# Patient Record
Sex: MALE | Race: WHITE | NOT HISPANIC OR LATINO | URBAN - METROPOLITAN AREA
[De-identification: names, ages, dates, MRNs, and addresses within clinical notes are randomized per-mention and may not be internally consistent; named-entity substitution may affect disease eponyms.]

---

## 2019-03-14 ENCOUNTER — RECORDS - HEALTHEAST (OUTPATIENT)
Dept: LAB | Facility: CLINIC | Age: 84
End: 2019-03-14

## 2019-03-15 ENCOUNTER — COMMUNICATION - HEALTHEAST (OUTPATIENT)
Dept: GERIATRICS | Facility: CLINIC | Age: 84
End: 2019-03-15

## 2019-03-15 ENCOUNTER — OFFICE VISIT - HEALTHEAST (OUTPATIENT)
Dept: GERIATRICS | Facility: CLINIC | Age: 84
End: 2019-03-15

## 2019-03-15 DIAGNOSIS — R53.81 PHYSICAL DECONDITIONING: ICD-10-CM

## 2019-03-15 DIAGNOSIS — R19.7 DIARRHEA, UNSPECIFIED TYPE: ICD-10-CM

## 2019-03-15 LAB — INR PPP: 1.4 (ref 0.9–1.1)

## 2019-03-16 ENCOUNTER — RECORDS - HEALTHEAST (OUTPATIENT)
Dept: LAB | Facility: CLINIC | Age: 84
End: 2019-03-16

## 2019-03-16 LAB
ANION GAP SERPL CALCULATED.3IONS-SCNC: 10 MMOL/L (ref 5–18)
BUN SERPL-MCNC: 13 MG/DL (ref 8–28)
C DIFF TOX B STL QL: NEGATIVE
CALCIUM SERPL-MCNC: 8.8 MG/DL (ref 8.5–10.5)
CHLORIDE BLD-SCNC: 105 MMOL/L (ref 98–107)
CO2 SERPL-SCNC: 26 MMOL/L (ref 22–31)
CREAT SERPL-MCNC: 1.01 MG/DL (ref 0.7–1.3)
ERYTHROCYTE [DISTWIDTH] IN BLOOD BY AUTOMATED COUNT: 14 % (ref 11–14.5)
GFR SERPL CREATININE-BSD FRML MDRD: >60 ML/MIN/1.73M2
GLUCOSE BLD-MCNC: 160 MG/DL (ref 70–125)
HCT VFR BLD AUTO: 33.8 % (ref 40–54)
HGB BLD-MCNC: 10.8 G/DL (ref 14–18)
MAGNESIUM SERPL-MCNC: 1.6 MG/DL (ref 1.8–2.6)
MCH RBC QN AUTO: 31.2 PG (ref 27–34)
MCHC RBC AUTO-ENTMCNC: 32 G/DL (ref 32–36)
MCV RBC AUTO: 98 FL (ref 80–100)
PLATELET # BLD AUTO: 261 THOU/UL (ref 140–440)
PMV BLD AUTO: 10.1 FL (ref 8.5–12.5)
POTASSIUM BLD-SCNC: 5.1 MMOL/L (ref 3.5–5)
RBC # BLD AUTO: 3.46 MILL/UL (ref 4.4–6.2)
RIBOTYPE 027/NAP1/BI: NORMAL
SODIUM SERPL-SCNC: 141 MMOL/L (ref 136–145)
WBC: 6.7 THOU/UL (ref 4–11)

## 2019-03-17 ENCOUNTER — AMBULATORY - HEALTHEAST (OUTPATIENT)
Dept: ADMINISTRATIVE | Facility: CLINIC | Age: 84
End: 2019-03-17

## 2019-03-17 RX ORDER — ATORVASTATIN CALCIUM 40 MG/1
40 TABLET, FILM COATED ORAL AT BEDTIME
Status: SHIPPED | COMMUNITY
Start: 2019-03-17

## 2019-03-17 RX ORDER — DULOXETIN HYDROCHLORIDE 60 MG/1
60 CAPSULE, DELAYED RELEASE ORAL DAILY
Status: SHIPPED | COMMUNITY
Start: 2019-03-17

## 2019-03-17 RX ORDER — METOPROLOL SUCCINATE 25 MG/1
25 TABLET, EXTENDED RELEASE ORAL DAILY
Status: SHIPPED | COMMUNITY
Start: 2019-03-17

## 2019-03-18 LAB — INR PPP: 2.16 (ref 0.9–1.1)

## 2019-03-19 ENCOUNTER — OFFICE VISIT - HEALTHEAST (OUTPATIENT)
Dept: GERIATRICS | Facility: CLINIC | Age: 84
End: 2019-03-19

## 2019-03-19 DIAGNOSIS — C18.9: ICD-10-CM

## 2019-03-19 DIAGNOSIS — I48.19 PERSISTENT ATRIAL FIBRILLATION (H): ICD-10-CM

## 2019-03-19 DIAGNOSIS — D64.9 ANEMIA, UNSPECIFIED TYPE: ICD-10-CM

## 2019-03-19 DIAGNOSIS — R41.89 COGNITIVE IMPAIRMENT: ICD-10-CM

## 2019-03-20 ENCOUNTER — RECORDS - HEALTHEAST (OUTPATIENT)
Dept: LAB | Facility: CLINIC | Age: 84
End: 2019-03-20

## 2019-03-21 ENCOUNTER — OFFICE VISIT - HEALTHEAST (OUTPATIENT)
Dept: GERIATRICS | Facility: CLINIC | Age: 84
End: 2019-03-21

## 2019-03-21 DIAGNOSIS — R41.89 COGNITIVE IMPAIRMENT: ICD-10-CM

## 2019-03-21 DIAGNOSIS — D64.9 ANEMIA, UNSPECIFIED TYPE: ICD-10-CM

## 2019-03-21 DIAGNOSIS — I48.19 PERSISTENT ATRIAL FIBRILLATION (H): ICD-10-CM

## 2019-03-21 DIAGNOSIS — N28.9 KIDNEY DISEASE: ICD-10-CM

## 2019-03-21 DIAGNOSIS — C18.9: ICD-10-CM

## 2019-03-21 LAB
ANION GAP SERPL CALCULATED.3IONS-SCNC: 9 MMOL/L (ref 5–18)
BUN SERPL-MCNC: 20 MG/DL (ref 8–28)
CALCIUM SERPL-MCNC: 8.9 MG/DL (ref 8.5–10.5)
CHLORIDE BLD-SCNC: 103 MMOL/L (ref 98–107)
CO2 SERPL-SCNC: 26 MMOL/L (ref 22–31)
CREAT SERPL-MCNC: 1.32 MG/DL (ref 0.7–1.3)
GFR SERPL CREATININE-BSD FRML MDRD: 51 ML/MIN/1.73M2
GLUCOSE BLD-MCNC: 270 MG/DL (ref 70–125)
POTASSIUM BLD-SCNC: 5.1 MMOL/L (ref 3.5–5)
SODIUM SERPL-SCNC: 138 MMOL/L (ref 136–145)

## 2019-03-25 ENCOUNTER — COMMUNICATION - HEALTHEAST (OUTPATIENT)
Dept: GERIATRICS | Facility: CLINIC | Age: 84
End: 2019-03-25

## 2019-03-25 ENCOUNTER — RECORDS - HEALTHEAST (OUTPATIENT)
Dept: LAB | Facility: CLINIC | Age: 84
End: 2019-03-25

## 2019-03-25 ENCOUNTER — OFFICE VISIT - HEALTHEAST (OUTPATIENT)
Dept: GERIATRICS | Facility: CLINIC | Age: 84
End: 2019-03-25

## 2019-03-25 DIAGNOSIS — E87.5 HYPERKALEMIA: ICD-10-CM

## 2019-03-25 DIAGNOSIS — I48.21 PERMANENT ATRIAL FIBRILLATION (H): ICD-10-CM

## 2019-03-25 LAB — INR PPP: 3.61 (ref 0.9–1.1)

## 2019-03-26 ENCOUNTER — RECORDS - HEALTHEAST (OUTPATIENT)
Dept: LAB | Facility: CLINIC | Age: 84
End: 2019-03-26

## 2019-03-26 ENCOUNTER — COMMUNICATION - HEALTHEAST (OUTPATIENT)
Dept: GERIATRICS | Facility: CLINIC | Age: 84
End: 2019-03-26

## 2019-03-26 LAB
INR PPP: 3.35 (ref 0.9–1.1)
POTASSIUM BLD-SCNC: 4.4 MMOL/L (ref 3.5–5)

## 2019-03-27 ENCOUNTER — OFFICE VISIT - HEALTHEAST (OUTPATIENT)
Dept: GERIATRICS | Facility: CLINIC | Age: 84
End: 2019-03-27

## 2019-03-27 DIAGNOSIS — K56.609 COLON OBSTRUCTION (H): ICD-10-CM

## 2019-03-27 DIAGNOSIS — I48.21 PERMANENT ATRIAL FIBRILLATION (H): ICD-10-CM

## 2019-03-27 RX ORDER — MAGNESIUM OXIDE 400 MG/1
400 TABLET ORAL 2 TIMES DAILY
Status: SHIPPED | COMMUNITY
Start: 2019-03-27

## 2019-03-27 RX ORDER — FERROUS SULFATE 325(65) MG
1 TABLET ORAL
Status: SHIPPED | COMMUNITY
Start: 2019-03-27

## 2019-03-29 ENCOUNTER — AMBULATORY - HEALTHEAST (OUTPATIENT)
Dept: GERIATRICS | Facility: CLINIC | Age: 84
End: 2019-03-29

## 2020-04-28 ENCOUNTER — AMBULATORY - HEALTHEAST (OUTPATIENT)
Dept: LAB | Facility: HOSPITAL | Age: 85
End: 2020-04-28

## 2020-04-28 DIAGNOSIS — C18.2 MALIGNANT NEOPLASM OF ASCENDING COLON (H): ICD-10-CM

## 2020-04-28 LAB — CEA SERPL-MCNC: 5.2 NG/ML (ref 0–3)

## 2020-09-08 ENCOUNTER — RECORDS - HEALTHEAST (OUTPATIENT)
Dept: LAB | Facility: CLINIC | Age: 85
End: 2020-09-08

## 2020-09-09 LAB
ALBUMIN SERPL-MCNC: 3.6 G/DL (ref 3.5–5)
ALP SERPL-CCNC: 120 U/L (ref 45–120)
ALT SERPL W P-5'-P-CCNC: 17 U/L (ref 0–45)
ANION GAP SERPL CALCULATED.3IONS-SCNC: 10 MMOL/L (ref 5–18)
AST SERPL W P-5'-P-CCNC: 23 U/L (ref 0–40)
BASOPHILS # BLD AUTO: 0 THOU/UL (ref 0–0.2)
BASOPHILS NFR BLD AUTO: 1 % (ref 0–2)
BILIRUB SERPL-MCNC: 0.8 MG/DL (ref 0–1)
BUN SERPL-MCNC: 21 MG/DL (ref 8–28)
CALCIUM SERPL-MCNC: 9 MG/DL (ref 8.5–10.5)
CHLORIDE BLD-SCNC: 99 MMOL/L (ref 98–107)
CHOLEST SERPL-MCNC: 128 MG/DL
CO2 SERPL-SCNC: 29 MMOL/L (ref 22–31)
CREAT SERPL-MCNC: 1.39 MG/DL (ref 0.7–1.3)
EOSINOPHIL # BLD AUTO: 0.2 THOU/UL (ref 0–0.4)
EOSINOPHIL NFR BLD AUTO: 4 % (ref 0–6)
ERYTHROCYTE [DISTWIDTH] IN BLOOD BY AUTOMATED COUNT: 12.6 % (ref 11–14.5)
FASTING STATUS PATIENT QL REPORTED: ABNORMAL
GFR SERPL CREATININE-BSD FRML MDRD: 48 ML/MIN/1.73M2
GLUCOSE BLD-MCNC: 267 MG/DL (ref 70–125)
HCT VFR BLD AUTO: 39.4 % (ref 40–54)
HDLC SERPL-MCNC: 29 MG/DL
HGB BLD-MCNC: 12.6 G/DL (ref 14–18)
IMM GRANULOCYTES # BLD: 0 THOU/UL
IMM GRANULOCYTES NFR BLD: 0 %
LDLC SERPL CALC-MCNC: 47 MG/DL
LYMPHOCYTES # BLD AUTO: 1.1 THOU/UL (ref 0.8–4.4)
LYMPHOCYTES NFR BLD AUTO: 18 % (ref 20–40)
MCH RBC QN AUTO: 32.3 PG (ref 27–34)
MCHC RBC AUTO-ENTMCNC: 32 G/DL (ref 32–36)
MCV RBC AUTO: 101 FL (ref 80–100)
MONOCYTES # BLD AUTO: 0.5 THOU/UL (ref 0–0.9)
MONOCYTES NFR BLD AUTO: 9 % (ref 2–10)
NEUTROPHILS # BLD AUTO: 4 THOU/UL (ref 2–7.7)
NEUTROPHILS NFR BLD AUTO: 68 % (ref 50–70)
PLATELET # BLD AUTO: 189 THOU/UL (ref 140–440)
PMV BLD AUTO: 10.8 FL (ref 8.5–12.5)
POTASSIUM BLD-SCNC: 4.2 MMOL/L (ref 3.5–5)
PROT SERPL-MCNC: 7.2 G/DL (ref 6–8)
RBC # BLD AUTO: 3.9 MILL/UL (ref 4.4–6.2)
SODIUM SERPL-SCNC: 138 MMOL/L (ref 136–145)
TRIGL SERPL-MCNC: 258 MG/DL
WBC: 5.9 THOU/UL (ref 4–11)

## 2020-10-06 ENCOUNTER — RECORDS - HEALTHEAST (OUTPATIENT)
Dept: LAB | Facility: CLINIC | Age: 85
End: 2020-10-06

## 2020-10-07 LAB
FERRITIN SERPL-MCNC: 154 NG/ML (ref 27–300)
HBA1C MFR BLD: 7.7 %
IRON SERPL-MCNC: 78 UG/DL (ref 42–175)
MAGNESIUM SERPL-MCNC: 1.5 MG/DL (ref 1.8–2.6)
VIT B12 SERPL-MCNC: 777 PG/ML (ref 213–816)

## 2020-10-08 LAB — 25(OH)D3 SERPL-MCNC: 24.2 NG/ML (ref 30–80)

## 2020-10-21 ENCOUNTER — RECORDS - HEALTHEAST (OUTPATIENT)
Dept: LAB | Facility: CLINIC | Age: 85
End: 2020-10-21

## 2020-10-21 LAB
AFP SERPL-MCNC: 2.1 UG/ML
ALBUMIN SERPL-MCNC: 4.2 G/DL (ref 3.5–5)
ALP SERPL-CCNC: 97 U/L (ref 45–120)
ALT SERPL W P-5'-P-CCNC: 16 U/L (ref 0–45)
ANION GAP SERPL CALCULATED.3IONS-SCNC: 13 MMOL/L (ref 5–18)
AST SERPL W P-5'-P-CCNC: 22 U/L (ref 0–40)
BILIRUB SERPL-MCNC: 1.1 MG/DL (ref 0–1)
BUN SERPL-MCNC: 25 MG/DL (ref 8–28)
CALCIUM SERPL-MCNC: 9.3 MG/DL (ref 8.5–10.5)
CHLORIDE BLD-SCNC: 103 MMOL/L (ref 98–107)
CO2 SERPL-SCNC: 25 MMOL/L (ref 22–31)
CREAT SERPL-MCNC: 1.32 MG/DL (ref 0.7–1.3)
ERYTHROCYTE [DISTWIDTH] IN BLOOD BY AUTOMATED COUNT: 12.8 % (ref 11–14.5)
GFR SERPL CREATININE-BSD FRML MDRD: 51 ML/MIN/1.73M2
GLUCOSE BLD-MCNC: 123 MG/DL (ref 70–125)
HCT VFR BLD AUTO: 40.3 % (ref 40–54)
HGB BLD-MCNC: 13.3 G/DL (ref 14–18)
MCH RBC QN AUTO: 33 PG (ref 27–34)
MCHC RBC AUTO-ENTMCNC: 33 G/DL (ref 32–36)
MCV RBC AUTO: 100 FL (ref 80–100)
PLATELET # BLD AUTO: 206 THOU/UL (ref 140–440)
PMV BLD AUTO: 10.6 FL (ref 8.5–12.5)
POTASSIUM BLD-SCNC: 5.3 MMOL/L (ref 3.5–5)
PROT SERPL-MCNC: 7.3 G/DL (ref 6–8)
RBC # BLD AUTO: 4.03 MILL/UL (ref 4.4–6.2)
SODIUM SERPL-SCNC: 141 MMOL/L (ref 136–145)
WBC: 8.3 THOU/UL (ref 4–11)

## 2020-10-27 ENCOUNTER — RECORDS - HEALTHEAST (OUTPATIENT)
Dept: LAB | Facility: CLINIC | Age: 85
End: 2020-10-27

## 2020-10-28 LAB
ANION GAP SERPL CALCULATED.3IONS-SCNC: 9 MMOL/L (ref 5–18)
BUN SERPL-MCNC: 27 MG/DL (ref 8–28)
CALCIUM SERPL-MCNC: 9.1 MG/DL (ref 8.5–10.5)
CHLORIDE BLD-SCNC: 100 MMOL/L (ref 98–107)
CO2 SERPL-SCNC: 28 MMOL/L (ref 22–31)
CREAT SERPL-MCNC: 1.31 MG/DL (ref 0.7–1.3)
GFR SERPL CREATININE-BSD FRML MDRD: 51 ML/MIN/1.73M2
GLUCOSE BLD-MCNC: 220 MG/DL (ref 70–125)
POTASSIUM BLD-SCNC: 3.9 MMOL/L (ref 3.5–5)
SODIUM SERPL-SCNC: 137 MMOL/L (ref 136–145)

## 2020-11-26 ENCOUNTER — NURSE TRIAGE (OUTPATIENT)
Dept: NURSING | Facility: CLINIC | Age: 85
End: 2020-11-26

## 2020-11-27 NOTE — TELEPHONE ENCOUNTER
Natalio (son) calls and says that his father now lives at an assisted living facility and has covid. Natalio says that his father has a cough, headache, and a sore throat. Natalio says that he does not know which hospital to take his father to if his father gets worse. RN told Natalio that his father needs to go to an ER that his insurance pays for. Natalio voiced understanding. Natalio says that he will monitor his father's condition with the nurse at the assisted living facility. COVID 19 Nurse Triage Plan/Patient Instructions    Please be aware that novel coronavirus (COVID-19) may be circulating in the community. If you develop symptoms such as fever, cough, or SOB or if you have concerns about the presence of another infection including coronavirus (COVID-19), please contact your health care provider or visit [email or web address].     Disposition/Instructions    Home care recommended. Follow home care protocol based instructions.    Thank you for taking steps to prevent the spread of this virus.  o Limit your contact with others.  o Wear a simple mask to cover your cough.  o Wash your hands well and often.    Resources    [facility]: About COVID-19: [email or web address]    CDC: What to Do If You're Sick: www.cdc.gov/coronavirus/2019-ncov/about/steps-when-sick.html    CDC: Ending Home Isolation: www.cdc.gov/coronavirus/2019-ncov/hcp/disposition-in-home-patients.html     CDC: Caring for Someone: www.cdc.gov/coronavirus/2019-ncov/if-you-are-sick/care-for-someone.html     [facility]: Interim Guidance for Hospital Discharge to Home: www.health.[facility].mn.us/diseases/coronavirus/hcp/hospdischarge.pdf    [facility] clinical trials (COVID-19 research studies): clinicalaffairs.[facility].edu/um-clinical-trials     Below are the COVID-19 hotlines at the [facility] ([facility]). Interpreters are available.   o For health questions: Call [phone] or 1-644.876.9442 (7 a.m. to 7 p.m.)  o For questions about schools and  childcare: Call [phone] or [phone] (7 a.m. to 7 p.m.)                     Additional Information    Negative: [1] Caller is not with the adult (patient) AND [2] reporting urgent symptoms    Negative: Lab result questions    Negative: Medication questions    Negative: Caller can't be reached by phone    Negative: Caller has already spoken to PCP or another triager    Negative: RN needs further essential information from caller in order to complete triage    Negative: Requesting regular office appointment    Negative: [1] Caller requesting NON-URGENT health information AND [2] PCP's office is the best resource    Health Information question, no triage required and triager able to answer question    Protocols used: INFORMATION ONLY CALL-A-

## 2020-11-27 NOTE — TELEPHONE ENCOUNTER
Father is currently in assisted living. He had COVID--tested positive last Friday. He is not having difficulty breathing. Since yesterday, standing order if he gets worse to send him to the hospital.   SX: sore throat, nonproductive dry cough, body aches (began yesterday). Oximeter= 95%. No shortness of breath. Headache. They are giving him Tylenol and Ibuprofen for the pain, and it is helping.   Son is wondering about antibody treatment ? They are using it at [place], but they are unable to bring him there. Son wants to send him to a hospital that has it. He states his father's Dr ordered the drug, but they have been unable to locate it.   [facility] ER phone number. Son will call them now.     Kayley Fish RN Triage Nurse Advisor 10:03 PM 11/26/2020    Additional Information    General information question, no triage required and triager able to answer question    Protocols used: INFORMATION ONLY CALL-A-

## 2020-12-28 ENCOUNTER — RECORDS - HEALTHEAST (OUTPATIENT)
Dept: LAB | Facility: CLINIC | Age: 85
End: 2020-12-28

## 2020-12-28 LAB
ALBUMIN UR-MCNC: ABNORMAL MG/DL
APPEARANCE UR: CLEAR
BACTERIA #/AREA URNS HPF: ABNORMAL HPF
BILIRUB UR QL STRIP: NEGATIVE
COLOR UR AUTO: YELLOW
GLUCOSE UR STRIP-MCNC: NEGATIVE MG/DL
HGB UR QL STRIP: NEGATIVE
KETONES UR STRIP-MCNC: NEGATIVE MG/DL
LEUKOCYTE ESTERASE UR QL STRIP: NEGATIVE
NITRATE UR QL: NEGATIVE
PH UR STRIP: 5.5 [PH] (ref 4.5–8)
RBC #/AREA URNS AUTO: ABNORMAL HPF
SP GR UR STRIP: 1.02 (ref 1–1.03)
SQUAMOUS #/AREA URNS AUTO: ABNORMAL LPF
UROBILINOGEN UR STRIP-ACNC: ABNORMAL
WBC #/AREA URNS AUTO: ABNORMAL HPF

## 2021-01-12 ENCOUNTER — RECORDS - HEALTHEAST (OUTPATIENT)
Dept: LAB | Facility: CLINIC | Age: 86
End: 2021-01-12

## 2021-01-12 LAB
SARS-COV-2 PCR COMMENT: NORMAL
SARS-COV-2 RNA SPEC QL NAA+PROBE: NEGATIVE
SARS-COV-2 VIRUS SPECIMEN SOURCE: NORMAL

## 2021-02-04 ENCOUNTER — AMBULATORY - HEALTHEAST (OUTPATIENT)
Dept: GERIATRICS | Facility: CLINIC | Age: 86
End: 2021-02-04

## 2021-02-04 DIAGNOSIS — K21.9 GASTROESOPHAGEAL REFLUX DISEASE WITHOUT ESOPHAGITIS: ICD-10-CM

## 2021-02-05 ENCOUNTER — RECORDS - HEALTHEAST (OUTPATIENT)
Dept: LAB | Facility: CLINIC | Age: 86
End: 2021-02-05

## 2021-02-05 ENCOUNTER — OFFICE VISIT - HEALTHEAST (OUTPATIENT)
Dept: GERIATRICS | Facility: CLINIC | Age: 86
End: 2021-02-05

## 2021-02-05 DIAGNOSIS — I48.20 CHRONIC ATRIAL FIBRILLATION (H): ICD-10-CM

## 2021-02-05 DIAGNOSIS — K65.1 ABDOMINAL VISCERAL ABSCESS (H): ICD-10-CM

## 2021-02-05 DIAGNOSIS — I10 ESSENTIAL HYPERTENSION: ICD-10-CM

## 2021-02-05 DIAGNOSIS — Z71.89 ACP (ADVANCE CARE PLANNING): ICD-10-CM

## 2021-02-05 DIAGNOSIS — E11.9 TYPE 2 DIABETES MELLITUS WITHOUT COMPLICATION, WITHOUT LONG-TERM CURRENT USE OF INSULIN (H): ICD-10-CM

## 2021-02-05 RX ORDER — SENNOSIDES 8.6 MG
650 CAPSULE ORAL EVERY 4 HOURS PRN
Status: SHIPPED | COMMUNITY
Start: 2021-02-05

## 2021-02-05 RX ORDER — GLIPIZIDE 2.5 MG/1
2.5 TABLET, EXTENDED RELEASE ORAL DAILY
Status: SHIPPED | COMMUNITY
Start: 2021-02-05

## 2021-02-05 RX ORDER — FINASTERIDE 5 MG/1
5 TABLET, FILM COATED ORAL DAILY
Status: SHIPPED | COMMUNITY
Start: 2021-02-05

## 2021-02-05 RX ORDER — FUROSEMIDE 20 MG
20 TABLET ORAL
Status: SHIPPED | COMMUNITY
Start: 2021-02-05

## 2021-02-05 RX ORDER — OXYCODONE HYDROCHLORIDE 5 MG/1
2.5 TABLET ORAL EVERY 4 HOURS PRN
Status: SHIPPED | COMMUNITY
Start: 2021-02-05

## 2021-02-05 RX ORDER — PANTOPRAZOLE SODIUM 40 MG/1
40 TABLET, DELAYED RELEASE ORAL DAILY
Status: SHIPPED | COMMUNITY
Start: 2021-02-05

## 2021-02-05 RX ORDER — MULTIVIT-MIN/IRON FUM/FOLIC AC 7.5 MG-4
1 TABLET ORAL DAILY
Status: SHIPPED | COMMUNITY
Start: 2021-02-05

## 2021-02-05 RX ORDER — LOPERAMIDE HCL 2 MG
2 CAPSULE ORAL PRN
Status: SHIPPED | COMMUNITY
Start: 2021-02-05

## 2021-02-05 RX ORDER — B-COMPLEX WITH VITAMIN C
1 TABLET ORAL DAILY
Status: SHIPPED | COMMUNITY
Start: 2021-02-05

## 2021-02-06 LAB — INR PPP: 1.41 (ref 0.9–1.1)

## 2021-02-07 ENCOUNTER — RECORDS - HEALTHEAST (OUTPATIENT)
Dept: LAB | Facility: CLINIC | Age: 86
End: 2021-02-07

## 2021-02-08 ENCOUNTER — COMMUNICATION - HEALTHEAST (OUTPATIENT)
Dept: GERIATRICS | Facility: CLINIC | Age: 86
End: 2021-02-08

## 2021-02-08 ENCOUNTER — OFFICE VISIT - HEALTHEAST (OUTPATIENT)
Dept: GERIATRICS | Facility: CLINIC | Age: 86
End: 2021-02-08

## 2021-02-08 DIAGNOSIS — K56.609 COLON OBSTRUCTION (H): ICD-10-CM

## 2021-02-08 DIAGNOSIS — K65.1 ABDOMINAL VISCERAL ABSCESS (H): ICD-10-CM

## 2021-02-08 DIAGNOSIS — I48.20 CHRONIC ATRIAL FIBRILLATION (H): ICD-10-CM

## 2021-02-08 LAB
ANION GAP SERPL CALCULATED.3IONS-SCNC: 11 MMOL/L (ref 5–18)
BUN SERPL-MCNC: 20 MG/DL (ref 8–28)
CALCIUM SERPL-MCNC: 9.1 MG/DL (ref 8.5–10.5)
CHLORIDE BLD-SCNC: 100 MMOL/L (ref 98–107)
CO2 SERPL-SCNC: 26 MMOL/L (ref 22–31)
CREAT SERPL-MCNC: 1.28 MG/DL (ref 0.7–1.3)
ERYTHROCYTE [DISTWIDTH] IN BLOOD BY AUTOMATED COUNT: 12.1 % (ref 11–14.5)
GFR SERPL CREATININE-BSD FRML MDRD: 52 ML/MIN/1.73M2
GLUCOSE BLD-MCNC: 280 MG/DL (ref 70–125)
HCT VFR BLD AUTO: 38.5 % (ref 40–54)
HGB BLD-MCNC: 12.5 G/DL (ref 14–18)
INR PPP: 2.01 (ref 0.9–1.1)
MAGNESIUM SERPL-MCNC: 1.9 MG/DL (ref 1.8–2.6)
MCH RBC QN AUTO: 32.6 PG (ref 27–34)
MCHC RBC AUTO-ENTMCNC: 32.5 G/DL (ref 32–36)
MCV RBC AUTO: 101 FL (ref 80–100)
PLATELET # BLD AUTO: 316 THOU/UL (ref 140–440)
PMV BLD AUTO: 10.3 FL (ref 8.5–12.5)
POTASSIUM BLD-SCNC: 4.7 MMOL/L (ref 3.5–5)
RBC # BLD AUTO: 3.83 MILL/UL (ref 4.4–6.2)
SODIUM SERPL-SCNC: 137 MMOL/L (ref 136–145)
WBC: 9.5 THOU/UL (ref 4–11)

## 2021-02-08 ASSESSMENT — MIFFLIN-ST. JEOR: SCORE: 1533.04

## 2021-02-09 ENCOUNTER — OFFICE VISIT - HEALTHEAST (OUTPATIENT)
Dept: GERIATRICS | Facility: CLINIC | Age: 86
End: 2021-02-09

## 2021-02-09 DIAGNOSIS — I48.20 CHRONIC ATRIAL FIBRILLATION (H): ICD-10-CM

## 2021-02-09 DIAGNOSIS — Z85.038 HISTORY OF MALIGNANT NEOPLASM OF COLON: ICD-10-CM

## 2021-02-09 DIAGNOSIS — I10 ESSENTIAL HYPERTENSION: ICD-10-CM

## 2021-02-09 DIAGNOSIS — N18.30 STAGE 3 CHRONIC KIDNEY DISEASE, UNSPECIFIED WHETHER STAGE 3A OR 3B CKD (H): ICD-10-CM

## 2021-02-09 DIAGNOSIS — K65.1 ABDOMINAL VISCERAL ABSCESS (H): ICD-10-CM

## 2021-02-09 DIAGNOSIS — E11.9 TYPE 2 DIABETES MELLITUS WITHOUT COMPLICATION, WITHOUT LONG-TERM CURRENT USE OF INSULIN (H): ICD-10-CM

## 2021-02-10 ENCOUNTER — OFFICE VISIT - HEALTHEAST (OUTPATIENT)
Dept: GERIATRICS | Facility: CLINIC | Age: 86
End: 2021-02-10

## 2021-02-10 DIAGNOSIS — Z79.01 LONG TERM CURRENT USE OF ANTICOAGULANT THERAPY: ICD-10-CM

## 2021-02-10 DIAGNOSIS — K65.1 ABDOMINAL VISCERAL ABSCESS (H): ICD-10-CM

## 2021-02-12 ENCOUNTER — AMBULATORY - HEALTHEAST (OUTPATIENT)
Dept: GERIATRICS | Facility: CLINIC | Age: 86
End: 2021-02-12

## 2021-02-18 ENCOUNTER — RECORDS - HEALTHEAST (OUTPATIENT)
Dept: LAB | Facility: CLINIC | Age: 86
End: 2021-02-18

## 2021-02-18 LAB — HEMOCCULT STL QL: NEGATIVE

## 2021-03-20 ENCOUNTER — RECORDS - HEALTHEAST (OUTPATIENT)
Dept: LAB | Facility: CLINIC | Age: 86
End: 2021-03-20

## 2021-03-20 LAB — HEMOCCULT STL QL: POSITIVE

## 2021-03-22 ENCOUNTER — RECORDS - HEALTHEAST (OUTPATIENT)
Dept: LAB | Facility: CLINIC | Age: 86
End: 2021-03-22

## 2021-03-22 LAB
ALBUMIN UR-MCNC: NEGATIVE G/DL
APPEARANCE UR: CLEAR
BASOPHILS # BLD AUTO: 0 THOU/UL (ref 0–0.2)
BASOPHILS NFR BLD AUTO: 0 % (ref 0–2)
BILIRUB UR QL STRIP: NEGATIVE
COLOR UR AUTO: NORMAL
EOSINOPHIL # BLD AUTO: 0.2 THOU/UL (ref 0–0.4)
EOSINOPHIL NFR BLD AUTO: 3 % (ref 0–6)
ERYTHROCYTE [DISTWIDTH] IN BLOOD BY AUTOMATED COUNT: 13 % (ref 11–14.5)
GLUCOSE UR STRIP-MCNC: NEGATIVE MG/DL
HCT VFR BLD AUTO: 37.9 % (ref 40–54)
HGB BLD-MCNC: 12.2 G/DL (ref 14–18)
HGB UR QL STRIP: NEGATIVE
IMM GRANULOCYTES # BLD: 0 THOU/UL
IMM GRANULOCYTES NFR BLD: 0 %
KETONES UR STRIP-MCNC: NEGATIVE MG/DL
LEUKOCYTE ESTERASE UR QL STRIP: NEGATIVE
LYMPHOCYTES # BLD AUTO: 1.8 THOU/UL (ref 0.8–4.4)
LYMPHOCYTES NFR BLD AUTO: 31 % (ref 20–40)
MCH RBC QN AUTO: 32.2 PG (ref 27–34)
MCHC RBC AUTO-ENTMCNC: 32.2 G/DL (ref 32–36)
MCV RBC AUTO: 100 FL (ref 80–100)
MONOCYTES # BLD AUTO: 0.5 THOU/UL (ref 0–0.9)
MONOCYTES NFR BLD AUTO: 8 % (ref 2–10)
NEUTROPHILS # BLD AUTO: 3.2 THOU/UL (ref 2–7.7)
NEUTROPHILS NFR BLD AUTO: 57 % (ref 50–70)
NITRATE UR QL: NEGATIVE
PH UR STRIP: 5.5 [PH] (ref 5–8)
PLATELET # BLD AUTO: 168 THOU/UL (ref 140–440)
PMV BLD AUTO: 10.2 FL (ref 8.5–12.5)
RBC # BLD AUTO: 3.79 MILL/UL (ref 4.4–6.2)
SP GR UR STRIP: 1.01 (ref 1–1.03)
UROBILINOGEN UR STRIP-ACNC: NORMAL
WBC: 5.7 THOU/UL (ref 4–11)

## 2021-03-23 LAB — BACTERIA SPEC CULT: NO GROWTH

## 2021-04-23 ENCOUNTER — RECORDS - HEALTHEAST (OUTPATIENT)
Dept: LAB | Facility: CLINIC | Age: 86
End: 2021-04-23

## 2021-04-23 LAB — HEMOCCULT STL QL: NEGATIVE

## 2021-04-27 ENCOUNTER — RECORDS - HEALTHEAST (OUTPATIENT)
Dept: LAB | Facility: CLINIC | Age: 86
End: 2021-04-27

## 2021-04-28 LAB
ANION GAP SERPL CALCULATED.3IONS-SCNC: 13 MMOL/L (ref 5–18)
BASOPHILS # BLD AUTO: 0 THOU/UL (ref 0–0.2)
BASOPHILS NFR BLD AUTO: 0 % (ref 0–2)
BUN SERPL-MCNC: 21 MG/DL (ref 8–28)
CALCIUM SERPL-MCNC: 9.2 MG/DL (ref 8.5–10.5)
CHLORIDE BLD-SCNC: 102 MMOL/L (ref 98–107)
CO2 SERPL-SCNC: 23 MMOL/L (ref 22–31)
CREAT SERPL-MCNC: 1.26 MG/DL (ref 0.7–1.3)
EOSINOPHIL # BLD AUTO: 0.2 THOU/UL (ref 0–0.4)
EOSINOPHIL NFR BLD AUTO: 2 % (ref 0–6)
ERYTHROCYTE [DISTWIDTH] IN BLOOD BY AUTOMATED COUNT: 12.8 % (ref 11–14.5)
GFR SERPL CREATININE-BSD FRML MDRD: 53 ML/MIN/1.73M2
GLUCOSE BLD-MCNC: 161 MG/DL (ref 70–125)
HCT VFR BLD AUTO: 39.1 % (ref 40–54)
HGB BLD-MCNC: 12.7 G/DL (ref 14–18)
IMM GRANULOCYTES # BLD: 0.1 THOU/UL
IMM GRANULOCYTES NFR BLD: 1 %
LYMPHOCYTES # BLD AUTO: 2 THOU/UL (ref 0.8–4.4)
LYMPHOCYTES NFR BLD AUTO: 26 % (ref 20–40)
MCH RBC QN AUTO: 32.1 PG (ref 27–34)
MCHC RBC AUTO-ENTMCNC: 32.5 G/DL (ref 32–36)
MCV RBC AUTO: 99 FL (ref 80–100)
MONOCYTES # BLD AUTO: 0.6 THOU/UL (ref 0–0.9)
MONOCYTES NFR BLD AUTO: 8 % (ref 2–10)
NEUTROPHILS # BLD AUTO: 4.9 THOU/UL (ref 2–7.7)
NEUTROPHILS NFR BLD AUTO: 62 % (ref 50–70)
PLATELET # BLD AUTO: 182 THOU/UL (ref 140–440)
PMV BLD AUTO: 10.5 FL (ref 8.5–12.5)
POTASSIUM BLD-SCNC: 4.4 MMOL/L (ref 3.5–5)
RBC # BLD AUTO: 3.96 MILL/UL (ref 4.4–6.2)
SODIUM SERPL-SCNC: 138 MMOL/L (ref 136–145)
WBC: 7.8 THOU/UL (ref 4–11)

## 2021-05-18 ENCOUNTER — RECORDS - HEALTHEAST (OUTPATIENT)
Dept: LAB | Facility: CLINIC | Age: 86
End: 2021-05-18

## 2021-05-20 LAB
C REACTIVE PROTEIN LHE: 1.6 MG/DL (ref 0–0.8)
ERYTHROCYTE [DISTWIDTH] IN BLOOD BY AUTOMATED COUNT: 13.2 % (ref 11–14.5)
ERYTHROCYTE [SEDIMENTATION RATE] IN BLOOD BY WESTERGREN METHOD: 36 MM/HR (ref 0–15)
HCT VFR BLD AUTO: 36.5 % (ref 40–54)
HGB BLD-MCNC: 12 G/DL (ref 14–18)
INR PPP: 1.11 (ref 0.9–1.1)
MCH RBC QN AUTO: 32.4 PG (ref 27–34)
MCHC RBC AUTO-ENTMCNC: 32.9 G/DL (ref 32–36)
MCV RBC AUTO: 99 FL (ref 80–100)
PLATELET # BLD AUTO: 239 THOU/UL (ref 140–440)
PMV BLD AUTO: 10 FL (ref 8.5–12.5)
RBC # BLD AUTO: 3.7 MILL/UL (ref 4.4–6.2)
WBC: 7.4 THOU/UL (ref 4–11)

## 2021-05-25 ENCOUNTER — RECORDS - HEALTHEAST (OUTPATIENT)
Dept: LAB | Facility: CLINIC | Age: 86
End: 2021-05-25

## 2021-05-26 LAB — INR PPP: 1.76 (ref 0.9–1.1)

## 2021-05-27 NOTE — PROGRESS NOTES
"  [facility]      NAME:  Burke Recinos             :  1927    MRN: [id number]    CODE STATUS:  DNR/DNI    FACILITY: [facility] [[id number]]      CHIEF COMPLAIN/REASON FOR VISIT:  Chief Complaint   Patient presents with     Review Of Multiple Medical Conditions       HISTORY OF PRESENT ILLNESS: Burke Recinos is a 89 y.o. male being seen for review of multiple medical conditions. PMH of of dyslipidemia, atrial fibrillation, tachybrady syndrome s/p PPM (), CAD s/p PCI (), DM type II, iron deficiency anemia, polymyalgia rheumatica, and ANGELITO who was admitted on 3/9/2019 after presenting to the ED for evaluation of abdominal pain and vomiting.   Per chart review, the patient was admitted to [facility] from -2019 for evaluation of syncope. Telemetry and head CT were normal. He was seen by PT and noted to have some gait abnormalities. He was discharged home with PT.   The patient reports coffee-ground emesis the past few days prior to admission. No diarrhea but has to \"work hard\" at his stool to get it out. Abdominal pain severe at times, particularly in the lower abdomen, suprapubic area, he was found to have a bowel obstruction. After acute stay he is at the TCU for generalized weakness and deconditioned stae. INR today at 3.61, we will hole coumadin and recheck in am. Denies any bleeding. Reports bowels moving and denies abdominal pain.            Allergies   Allergen Reactions     Vancomycin      Other reaction(s): George Syndrome  Patient may receive vancomycin at rate that is half normal rate     Aspirin Other (See Comments)     Relative contraindication due to oral anticoagulation.     Metformin Diarrhea     Tamsulosin Other (See Comments)     renal failure     Niacin Rash   :     Current Outpatient Medications   Medication Sig     acetaminophen (TYLENOL) 325 MG tablet Take 650 mg by mouth every 4 (four) hours as needed for pain.     atorvastatin " (LIPITOR) 40 MG tablet Take 40 mg by mouth at bedtime.     cyanocobalamin 1000 MCG tablet Take 1,000 mcg by mouth daily.     DULoxetine (CYMBALTA) 60 MG capsule Take 60 mg by mouth daily.     glipiZIDE (GLUCOTROL) 10 MG tablet Take 10 mg by mouth 2 (two) times a day before meals.     metoprolol succinate (TOPROL-XL) 25 MG Take 25 mg by mouth 2 (two) times a day.     multivitamin (MULTIPLE VITAMINS ORAL) Take 1 tablet by mouth daily.     nitroglycerin (NITROSTAT) 0.4 MG SL tablet Place 0.4 mg under the tongue every 5 (five) minutes as needed for chest pain.     omega-3 fatty acids 1,000 mg cap Take 1 capsule by mouth daily.         REVIEW OF SYSTEMS:    Currently, no fever, chills, or rigors. Does not have any visual or hearing problems. Denies any chest pain, headaches, palpitations, lightheadedness, dizziness, shortness of breath, or cough. Appetite is good. Denies any GERD symptoms. Denies any difficulty with swallowing, nausea, or vomiting.  Denies any abdominal pain, diarrhea or constipation. Denies any urinary symptoms. No insomnia. No active bleeding. No rash.       PHYSICAL EXAMINATION:  Vitals:    03/25/19 1352   BP: 109/53   Pulse: 70   Temp: (!) 96.3  F (35.7  C)   Weight: 203 lb (92.1 kg)         GENERAL: Awake, Alert, oriented x3, not in any form of acute distress, answers questions appropriately, follows simple commands, conversant  HEENT: Head is normocephalic with normal hair distribution. No evidence of trauma. Ears: No acute purulent discharge. Eyes: Conjunctivae pink with no scleral jaundice. Nose: Normal mucosa and septum.   CHEST: No tenderness or deformity, no crepitus  LUNG: Clear to auscultation with good chest expansion. There are no crackles or wheezes, normal AP diameter.  BACK: No kyphosis of the thoracic spine. Symmetric, no curvature, ROM normal, no CVA tenderness, no spinal tenderness   CVS: There is good S1  S2,  rhythm is regular.  ABDOMEN: Globular and soft, nontender to palpation,   no masses, no organomegaly, good bowel sounds, no rebound or guarding, no peritoneal signs.   EXTREMITIES: Atraumatic. Full range of motion on both upper and lower extremities, there is no tenderness to palpation, no pedal edema, no cyanosis or clubbing, no calf tenderness, normal cap refill, no joint swelling.  SKIN: Warm and dry, no erythema noted, no rashes or lesions.  NEUROLOGICAL: The patient is oriented to person, place and time. Strength and sensation are grossly intact. Face is symmetric.                    LABS:    Lab Results   Component Value Date    WBC 6.7 03/16/2019    HGB 10.8 (L) 03/16/2019    HCT 33.8 (L) 03/16/2019    MCV 98 03/16/2019     03/16/2019       Results for orders placed or performed in visit on 03/21/19   Basic Metabolic Panel   Result Value Ref Range    Sodium 138 136 - 145 mmol/L    Potassium 5.1 (H) 3.5 - 5.0 mmol/L    Chloride 103 98 - 107 mmol/L    CO2 26 22 - 31 mmol/L    Anion Gap, Calculation 9 5 - 18 mmol/L    Glucose 270 (H) 70 - 125 mg/dL    Calcium 8.9 8.5 - 10.5 mg/dL    BUN 20 8 - 28 mg/dL    Creatinine 1.32 (H) 0.70 - 1.30 mg/dL    GFR MDRD Af Amer >60 >60 mL/min/1.73m2    GFR MDRD Non Af Amer 51 (L) >60 mL/min/1.73m2           No results found for: HGBA1C  No results found for: [id number]  No results found for: [id number]    ASSESSMENT/PLAN:  1. Permanent atrial fibrillation (H)    2. Hyperkalemia      1. AFIB: INR at 3.61, hold coumadin and recheck INR in am. No excessive bleeding or bruising, no melena or hematuria. Educated on shaving with electric razor vs a disposable straight edge. Report bleeding to [place].    2. K+ was elevated slightly  At 5.1, recheck BMP on 3/26/19.    Continue with current med regime and POC, while on the TCU.      Electronically signed by:  Daisy Bond CNP  This progress note was completed using Dragon software and there may be grammatical errors.

## 2021-05-28 NOTE — PROGRESS NOTES
[facility]    Facility:   [facility] [[id number]]   Code Status: DNR      CHIEF COMPLAINT/REASON FOR VISIT:  Chief Complaint   Patient presents with     Problem Visit     diarrhea       HISTORY:      HPI: Burke is a 89 y.o. male who is in TCU  at [facility] for acute rehabilitation s/p hospitalization for right hemicolectomy.    Today Mr. Recinos is being evaluated for diarrhea.  He reported that he has had loose stools with 4-5 episodes daily for the past few days.  He asked that he be given Imodium to alleviate this problem.  We discussed that need to rule out infectious causes prior to giving him Imodium therapy with him having possible exposure to infectious illness while inpatient for his surgery.  He was agreeable to testing his stool at this time.  The patient denied lightheadedness, dizziness, breathing difficulty, chest pain, palpitations, constipation, urinary symptoms, numbness or tingling in extremities, and pain. Nursing staff denied any other concerns for the patient at this time.    Past Medical History:   Diagnosis Date     Adenocarcinoma of colon (H)      Atrial fibrillation (H)      BPH (benign prostatic hyperplasia)      Cardiovascular disease      Colon obstruction (H)      Diverticulosis of colon      DM2 (diabetes mellitus, type 2) (H)      GERD (gastroesophageal reflux disease)      HLD (hyperlipidemia)      Iron deficiency anemia      Obstructive sleep apnea      Osteoarthrosis              Family History   Problem Relation Age of Onset     Breast cancer Mother      Alcohol abuse Father      Cancer Brother         brain      Social History     Socioeconomic History     Marital status:      Spouse name: None     Number of children: None     Years of education: None     Highest education level: None   Occupational History     None   Social Needs     Financial resource strain: None     Food insecurity:     Worry: None     Inability: None      Transportation needs:     Medical: None     Non-medical: None   Tobacco Use     Smoking status: Former Smoker     Types: Cigars     Last attempt to quit: 1970     Years since quittin.3     Smokeless tobacco: Never Used   Substance and Sexual Activity     Alcohol use: Yes     Drug use: None     Sexual activity: None   Lifestyle     Physical activity:     Days per week: None     Minutes per session: None     Stress: None   Relationships     Social connections:     Talks on phone: None     Gets together: None     Attends Muslim service: None     Active member of club or organization: None     Attends meetings of clubs or organizations: None     Relationship status: None     Intimate partner violence:     Fear of current or ex partner: None     Emotionally abused: None     Physically abused: None     Forced sexual activity: None   Other Topics Concern     None   Social History Narrative     None       REVIEW OF SYSTEM:  Pertinent items are noted in HPI.  A 12 point comprehensive review of systems was negative except as noted.    PHYSICAL EXAM:   There were no vitals taken for this visit.    General Appearance:    Alert, cooperative, no distress, appears stated age   Head:    Normocephalic, without obvious abnormality, atraumatic   Eyes:    PERRL, conjunctiva/corneas clear, both eyes        Ears:    Normal external ear canals, both ears   Nose:   Nares normal, septum midline, mucosa normal, no drainage    or sinus tenderness   Throat:   Lips, mucosa, and tongue normal; teeth and gums normal   Neck:   Supple, symmetrical, trachea midline, no adenopathy;        thyroid:  No enlargement/tenderness/nodules; no carotid    bruit or JVD   Back:     Symmetric, no curvature, ROM normal, no CVA tenderness   Lungs:     Clear to auscultation bilaterally, respirations unlabored   Chest wall:    No tenderness or deformity   Heart:    Regular rate and rhythm, S1 and S2 normal, no murmur, rub   or gallop   Abdomen:      Soft, non-tender, bowel sounds hyperactive all four quadrants, no masses, no organomegaly; abdominal incision CDI   Extremities:   Extremities normal, atraumatic, no cyanosis or edema   Pulses:   2+ and symmetric all extremities   Skin:   Skin color, texture, turgor normal, no rashes or lesions   Neurologic:   CNII-XII intact. Normal strength, sensation and reflexes       Throughout; oriented to person, place, time, and situation; generalized weakness         LABS:     C Diff stool culture ASAP    ASSESSMENT:      ICD-10-CM    1. Physical deconditioning R53.81    2. Diarrhea, unspecified type R19.7        PLAN:      Check C Diff stool culture to rule out infectious cause for patient's loose stools.  Consider Imodium if stool culture negative.  Encourage patient increased fluid intake to prevent dehydration with frequent loose stools.  Otherwise continue current care plan for all other chronic medical conditions, as they are stable. Encouraged patient to engage in healthy lifestyle behaviors such as engaging in social activities, exercising (PT/OT), eating well, and following care plan. Follow up for routine check-up, or sooner if needed. Will continue to monitor patient and work with nursing staff collaboratively to work toward positive patient outcomes.      Electronically signed by: Talia Rosas, LALITO

## 2021-06-02 ENCOUNTER — RECORDS - HEALTHEAST (OUTPATIENT)
Dept: LAB | Facility: CLINIC | Age: 86
End: 2021-06-02

## 2021-06-02 VITALS — WEIGHT: 203 LBS

## 2021-06-02 LAB
ANION GAP SERPL CALCULATED.3IONS-SCNC: 11 MMOL/L (ref 5–18)
BUN SERPL-MCNC: 19 MG/DL (ref 8–28)
C REACTIVE PROTEIN LHE: 2.7 MG/DL (ref 0–0.8)
CALCIUM SERPL-MCNC: 8.6 MG/DL (ref 8.5–10.5)
CHLORIDE BLD-SCNC: 102 MMOL/L (ref 98–107)
CO2 SERPL-SCNC: 25 MMOL/L (ref 22–31)
CREAT SERPL-MCNC: 1.24 MG/DL (ref 0.7–1.3)
ERYTHROCYTE [DISTWIDTH] IN BLOOD BY AUTOMATED COUNT: 13.1 % (ref 11–14.5)
ERYTHROCYTE [SEDIMENTATION RATE] IN BLOOD BY WESTERGREN METHOD: 35 MM/HR (ref 0–15)
GFR SERPL CREATININE-BSD FRML MDRD: 54 ML/MIN/1.73M2
GLUCOSE BLD-MCNC: 145 MG/DL (ref 70–125)
HCT VFR BLD AUTO: 35.5 % (ref 40–54)
HGB BLD-MCNC: 11.5 G/DL (ref 14–18)
INR PPP: 1.2 (ref 0.9–1.1)
MCH RBC QN AUTO: 31.7 PG (ref 27–34)
MCHC RBC AUTO-ENTMCNC: 32.4 G/DL (ref 32–36)
MCV RBC AUTO: 98 FL (ref 80–100)
PLATELET # BLD AUTO: 191 THOU/UL (ref 140–440)
PMV BLD AUTO: 10.6 FL (ref 8.5–12.5)
POTASSIUM BLD-SCNC: 4.5 MMOL/L (ref 3.5–5)
RBC # BLD AUTO: 3.63 MILL/UL (ref 4.4–6.2)
SODIUM SERPL-SCNC: 138 MMOL/L (ref 136–145)
WBC: 7.7 THOU/UL (ref 4–11)

## 2021-06-03 LAB — TROPONIN I SERPL-MCNC: <0.01 NG/ML (ref 0–0.29)

## 2021-06-05 VITALS
BODY MASS INDEX: 28.22 KG/M2 | SYSTOLIC BLOOD PRESSURE: 155 MMHG | WEIGHT: 185.6 LBS | TEMPERATURE: 97.9 F | DIASTOLIC BLOOD PRESSURE: 87 MMHG | HEART RATE: 65 BPM

## 2021-06-05 VITALS
TEMPERATURE: 98.3 F | WEIGHT: 202 LBS | HEART RATE: 81 BPM | SYSTOLIC BLOOD PRESSURE: 163 MMHG | DIASTOLIC BLOOD PRESSURE: 94 MMHG

## 2021-06-05 VITALS
OXYGEN SATURATION: 94 % | HEIGHT: 68 IN | SYSTOLIC BLOOD PRESSURE: 146 MMHG | DIASTOLIC BLOOD PRESSURE: 80 MMHG | RESPIRATION RATE: 24 BRPM | TEMPERATURE: 98.4 F | WEIGHT: 201.4 LBS | BODY MASS INDEX: 30.52 KG/M2 | HEART RATE: 71 BPM

## 2021-06-05 VITALS
TEMPERATURE: 98.1 F | SYSTOLIC BLOOD PRESSURE: 136 MMHG | WEIGHT: 203 LBS | HEART RATE: 80 BPM | DIASTOLIC BLOOD PRESSURE: 72 MMHG

## 2021-06-07 ENCOUNTER — RECORDS - HEALTHEAST (OUTPATIENT)
Dept: LAB | Facility: CLINIC | Age: 86
End: 2021-06-07

## 2021-06-09 LAB
ANION GAP SERPL CALCULATED.3IONS-SCNC: 12 MMOL/L (ref 5–18)
BASOPHILS # BLD AUTO: 0 THOU/UL (ref 0–0.2)
BASOPHILS NFR BLD AUTO: 0 % (ref 0–2)
BUN SERPL-MCNC: 30 MG/DL (ref 8–28)
C REACTIVE PROTEIN LHE: 2.2 MG/DL (ref 0–0.8)
CALCIUM SERPL-MCNC: 9 MG/DL (ref 8.5–10.5)
CHLORIDE BLD-SCNC: 98 MMOL/L (ref 98–107)
CO2 SERPL-SCNC: 26 MMOL/L (ref 22–31)
CREAT SERPL-MCNC: 1.3 MG/DL (ref 0.7–1.3)
EOSINOPHIL # BLD AUTO: 0.1 THOU/UL (ref 0–0.4)
EOSINOPHIL NFR BLD AUTO: 1 % (ref 0–6)
ERYTHROCYTE [DISTWIDTH] IN BLOOD BY AUTOMATED COUNT: 13.2 % (ref 11–14.5)
ERYTHROCYTE [SEDIMENTATION RATE] IN BLOOD BY WESTERGREN METHOD: 24 MM/HR (ref 0–15)
GFR SERPL CREATININE-BSD FRML MDRD: 52 ML/MIN/1.73M2
GLUCOSE BLD-MCNC: 198 MG/DL (ref 70–125)
HCT VFR BLD AUTO: 36.6 % (ref 40–54)
HGB BLD-MCNC: 12 G/DL (ref 14–18)
IMM GRANULOCYTES # BLD: 0.1 THOU/UL
IMM GRANULOCYTES NFR BLD: 1 %
INR PPP: 2.2 (ref 0.9–1.1)
LYMPHOCYTES # BLD AUTO: 1.6 THOU/UL (ref 0.8–4.4)
LYMPHOCYTES NFR BLD AUTO: 17 % (ref 20–40)
MCH RBC QN AUTO: 32 PG (ref 27–34)
MCHC RBC AUTO-ENTMCNC: 32.8 G/DL (ref 32–36)
MCV RBC AUTO: 98 FL (ref 80–100)
MONOCYTES # BLD AUTO: 0.8 THOU/UL (ref 0–0.9)
MONOCYTES NFR BLD AUTO: 8 % (ref 2–10)
NEUTROPHILS # BLD AUTO: 7 THOU/UL (ref 2–7.7)
NEUTROPHILS NFR BLD AUTO: 73 % (ref 50–70)
PLATELET # BLD AUTO: 203 THOU/UL (ref 140–440)
PMV BLD AUTO: 10.4 FL (ref 8.5–12.5)
POTASSIUM BLD-SCNC: 4.5 MMOL/L (ref 3.5–5)
RBC # BLD AUTO: 3.75 MILL/UL (ref 4.4–6.2)
SODIUM SERPL-SCNC: 136 MMOL/L (ref 136–145)
TROPONIN I SERPL-MCNC: 0.02 NG/ML (ref 0–0.29)
WBC: 9.5 THOU/UL (ref 4–11)

## 2021-06-13 ENCOUNTER — RECORDS - HEALTHEAST (OUTPATIENT)
Dept: LAB | Facility: CLINIC | Age: 86
End: 2021-06-13

## 2021-06-15 NOTE — TELEPHONE ENCOUNTER
[facility] Nurse Triage Anticoagulation Note      Provider: HUGO Bravo  Facility: [facility]    Facility Type: TCU    Caller: Catherine  Call Back Number:  [phone]    Reason for call: INR    Today s INR: 2.01  Previous INR: 2/6 1.41(3mg daily)    Diagnosis/Goal: AFIB  Heparin/Lovenox: No  Currently on ABX: Yes; Augmentin 500mg three times a day--ends 2/18/21.    Other interacting Medications: None  Missed or refused doses: No    Nurse also reporting LE drain in the LLQ is not flushing/draining.  IR was notified and the nurse suggested to just monitor for signs of infection and keep IR appt for 2/11/21.  Nurse states that there's already redness and drainage noted coming from around the LE site.      Verbal Order/Direction given by Provider: Warfarin 2mg daily.  Next INR 2/11/21.  Send patient to the ER due LE drain malfunction with signs of infection.      Provider giving order: HUGO Bravo    Verbal order given to: Catherine Encinas RN

## 2021-06-15 NOTE — PROGRESS NOTES
[facility] note      Patient: Burke Recinos  MRN: [id number]      [facility] [[id number]]  Reason for Visit     Chief Complaint   Patient presents with     H & P       Code Status   dnr    Assessment     Abdominal abscess status post IR drain placement on 2/1/2021  Cultures growing E. coli and strep angnosis  Status post repeat visit in the ER overnight because of nonfunctioning LE drain and abdominal pain  Underlying history of adenocarcinoma of the colon status post hemicolectomy  Hypertension  Chronic atrial fibrillation on warfarin  Type 2 diabetes  History of diabetic polyneuropathy  Chronic kidney disease  Anemia of chronic kidney disease.    Plan     Patient was admitted in the hospital and evaluated by both surgery as well as interventional radiology and infectious disease.  He was noted to have an abdominal abscess for which he had a drain placed.  Infectious disease recommended continuation of IV Zosyn in the hospital prior to discharge  At discharge he was switched to oral Augmentin  He has been recommended to go for an outpatient abscessogram within a week.  In addition it has been recommended that he continue Augmentin for a week after the drain is removed  Unfortunately his drain has not been working well and was suspected to be malpositioned.  Last night he went to the emergency room  Notes were reviewed and no new orders were obtained however he now has follow-up today with his surgeon.  Will await their recommendations-he has been n.p.o. since this morning at the recommendation  Pain concerns are stable and he is not reporting any acute pain he is tolerating a good regular diet  Recheck labs reviewed INR is therapeutic at 2  CBC with a hemoglobin of 12.5 no leukocytosis electrolytes and kidney functions are stable  Diabetic control reviewed and he is showing some elevated blood sugars.  Control appears to be suboptimal.  We will monitor trends before adjusting  medications.  Continue with his current PT OT      History     Patient is a very pleasant 89 y.o. male who is admitted to TCU  Patient presented to the emergency room with left upper quadrant pain with epigastric pain.  A CT of the abdomen and pelvis showed development of an abscess with the upper anterior peritoneal cavity  He was admitted in the hospital and underwent drain placement.  He is required to have a CT abdomen in 7 to 10 days and antibiotics recommended.  Fluid cultures grew E. coli and strep  He unfortunately had problems with his LE drain and was sent back to the emergency room yesterday.  He has a follow-up appointment today with his surgeon  Patient is also diabetic.  Last A1c was 8.3 indicating somewhat suboptimal control.  This is reflected in his blood sugars which are frequently over 200 noted in the TCU  He also has a history of chronic atrial fibrillation.  He is on Coumadin INRs will be monitored heart rates and blood pressures have been stable  Due to generalized weakness and debilitation admitted to the TCU for strengthening and rehab    Past Medical History     Active Ambulatory (Non-Hospital) Problems    Diagnosis     Sensorineural hearing loss (SNHL) of both ears     Chronic anemia     Coronary artery disease     Gastroesophageal reflux disease without esophagitis     History of malignant neoplasm of colon     Abdominal visceral abscess (H)     Hypomagnesemia     Low vitamin B12 level     Chest pain     CHF (congestive heart failure) (H)     Malaise and fatigue     Type 2 diabetes mellitus without complication (H)     Memory problem     Colon obstruction (H)     Chronic atrial fibrillation (H)     Hyperkalemia     Adenocarcinoma of colon (H)     Iron deficiency anemia     Benign prostatic hyperplasia with post-void dribbling     Diabetic peripheral neuropathy (H)     Poor balance     Long term current use of anticoagulant therapy     ACP (advance care planning)     Cardiac pacemaker in  situ     Essential hypertension     Cardiovascular disease     Sleep apnea     Osteoarthrosis involving lower leg     Pure hypercholesterolemia     Past Medical History:   Diagnosis Date     Abdominal visceral abscess (H) 1/31/2021     ACP (advance care planning) 1/25/2014     Adenocarcinoma of colon (H)      Atrial fibrillation (H)      Benign prostatic hyperplasia with post-void dribbling 12/12/2017     BPH (benign prostatic hyperplasia)      Cardiac pacemaker in situ 7/18/2012     Cardiovascular disease      Chest pain 1/20/2020     CHF (congestive heart failure) (H) 1/20/2020     Chronic anemia 2/4/2021     Chronic atrial fibrillation (H) 3/25/2019     Colon obstruction (H)      Coronary artery disease 2/4/2021     Diabetic peripheral neuropathy (H) 12/12/2017     Diverticulosis of colon      DM2 (diabetes mellitus, type 2) (H)      Essential hypertension 7/8/2008     GERD (gastroesophageal reflux disease)      History of malignant neoplasm of colon 2/4/2021     HLD (hyperlipidemia)      Hyperkalemia 3/25/2019     Hypomagnesemia 1/24/2020     Iron deficiency anemia      Long term current use of anticoagulant therapy 5/20/2016     Low vitamin B12 level 1/24/2020     Malaise and fatigue 1/20/2020     Memory problem 5/1/2019     Obstructive sleep apnea      Osteoarthrosis      Osteoarthrosis involving lower leg 7/10/2006     Poor balance 12/12/2017     Pure hypercholesterolemia 7/10/2006     Sensorineural hearing loss (SNHL) of both ears 2/4/2021     Sleep apnea 7/10/2006     Type 2 diabetes mellitus without complication (H) 12/5/2019       Past Social History     Reviewed, and he  reports that he quit smoking about 51 years ago. His smoking use included cigars. He has never used smokeless tobacco. He reports current alcohol use.    Family History     Reviewed, and family history includes Alcohol abuse in his father; Breast cancer in his mother; Cancer in his brother.    Medication List   Post Discharge Medication  Reconciliation Status: discharge medications reconciled, continue medications without change   Current Outpatient Medications on File Prior to Visit   Medication Sig Dispense Refill     acetaminophen (TYLENOL) 650 MG CR tablet Take 650 mg by mouth every 4 (four) hours as needed for pain.       amoxicillin-clavulanate (AUGMENTIN) 250-125 mg per tablet Take 1 tablet by mouth 3 (three) times a day. for 14 Days       atorvastatin (LIPITOR) 40 MG tablet Take 40 mg by mouth at bedtime.       cholecalciferol, vitamin D3, 1,000 unit (25 mcg) tablet Take 2,000 Units by mouth daily.       DULoxetine (CYMBALTA) 60 MG capsule Take 60 mg by mouth daily.       ferrous sulfate 325 (65 FE) MG tablet Take 1 tablet by mouth daily with breakfast.       finasteride (PROSCAR) 5 mg tablet Take 5 mg by mouth daily.       furosemide (LASIX) 20 MG tablet Take 20 mg by mouth 2 (two) times a week. every Mon, Thurs       glipiZIDE (GLUCOTROL XL) 2.5 MG 24 hr tablet Take 2.5 mg by mouth daily.       insulin aspart U-100 (NOVOLOG) 100 unit/mL (3 mL) injection pen Inject under the skin 3 (three) times a day before meals. Inject as per sliding scale:  if 200 - 249 = 1 unit;  250 - 299 = 2 unit;  300 - 349 = 3 unit;  350 - 399 = 4 unit;  400+ = 5 units And call MD,subcutaneously three times a day       loperamide (IMODIUM) 2 mg capsule Take 2 mg by mouth as needed for diarrhea (4mg with first loose stool. Max of 16mg in 24hours).       magnesium oxide (MAG-OX) 400 mg (241.3 mg magnesium) tablet Take 400 mg by mouth 2 (two) times a day.        metoprolol succinate (TOPROL-XL) 25 MG Take 25 mg by mouth daily. AND give 50 mg by mouth daily       multivitamin with minerals (MULTIPLE VITAMIN-MINERALS) tablet Take 1 tablet by mouth daily.       omega 3-dha-epa-fish oil 1,000 mg (120 mg-180 mg) cap Take 1 capsule by mouth daily.       oxyCODONE (ROXICODONE) 5 MG immediate release tablet Take 2.5 mg by mouth every 4 (four) hours as needed for pain.        "pantoprazole (PROTONIX) 40 MG tablet Take 40 mg by mouth daily.       warfarin sodium (WARFARIN ORAL) Take by mouth at bedtime. 2/8/21 INR 2.01  Take 2mg daily.  Next INR 2/11/21.       No current facility-administered medications on file prior to visit.        Allergies     Allergies   Allergen Reactions     Vancomycin      Other reaction(s): George Syndrome  Patient may receive vancomycin at rate that is half normal rate     Aspirin Other (See Comments)     Relative contraindication due to oral anticoagulation.     Metformin Diarrhea     Tamsulosin Other (See Comments)     renal failure     Niacin Rash       Review of Systems   A comprehensive review of 14 systems was done. Pertinent findings noted here and in history of present illness. All the rest negative.  Constitutional: Negative.  Negative for fever, chills, he has activity change, appetite change and fatigue.   HENT: Negative for congestion and facial swelling.    Eyes: Negative for photophobia, redness and visual disturbance.   Respiratory: Negative for cough and chest tightness.    Cardiovascular: Negative for chest pain, palpitations and leg swelling.   Gastrointestinal: Negative for nausea, diarrhea, constipation, blood in stool and abdominal distention.   LE drain is in position  Genitourinary: Negative.    Musculoskeletal: Negative.  Reports weakness but otherwise is doing well  Skin: Negative.    Neurological: Negative for dizziness, tremors, syncope, weakness, light-headedness and headaches.   Hematological: Does not bruise/bleed easily.   Psychiatric/Behavioral: Negative.        Physical Exam     Recent Vitals 2/8/2021   Height 5' 8\"   Weight 201 lbs 6 oz   BSA (m2) 2.09 m2   /80   Pulse 71   Temp 98.4   SpO2 94       Constitutional: Oriented to person, place, and time and appears well-developed.  Patient is obese  HEENT:  Normocephalic and atraumatic.  Eyes: Conjunctivae and EOM are normal. Pupils are equal, round, and reactive to light. No " discharge.  No scleral icterus. Nose normal. Mouth/Throat: Oropharynx is clear and moist. No oropharyngeal exudate.    [place]  NECK: Normal range of motion. Neck supple. No JVD present. No tracheal deviation present. No thyromegaly present.   CARDIOVASCULAR: Normal rate, regular rhythm and intact distal pulses.  Exam reveals no gallop and no friction rub.  Systolic murmur present.  Has a pacemaker  PULMONARY: Effort normal and breath sounds normal. No respiratory distress.No Wheezing or rales.  ABDOMEN: Soft. Bowel sounds are normal. No distension and no mass.  There is right upper quadrant tenderness.  LE drain is present with minimal drainage   there is no rebound and no guarding. No HSM.  MUSCULOSKELETAL: Normal range of motion. No edema and no tenderness. Mild kyphosis, no tenderness.  LYMPH NODES: Has no cervical, supraclavicular, axillary and groin adenopathy.   NEUROLOGICAL: Alert and oriented to person, place, and time. No cranial nerve deficit.  Normal muscle tone. Coordination normal.   GENITOURINARY: Deferred exam.  SKIN: Skin is warm and dry. No rash noted. No erythema. No pallor.   EXTREMITIES: No cyanosis, no clubbing, no edema. No Deformity.  PSYCHIATRIC: Normal mood, affect and behavior.      Lab Results     Recent Results (from the past 240 hour(s))   COVID-19 VIRUS (CORONAVIRUS) BY PCR - EXTERNAL RESULT    Collection Time: 01/31/21  7:16 PM    Specimen: Other    Specimen type and source: Other, Specimen from nasopharyngeal structure (specimen)   Result Value Ref Range    COVID-19 Virus by PCR (External Result) Negative Negative   COVID-19 VIRUS (CORONAVIRUS) BY PCR - EXTERNAL RESULT    Collection Time: 02/04/21 10:00 AM    Specimen: Other    Specimen type and source: Other, Specimen from nasopharyngeal structure (specimen)   Result Value Ref Range    COVID-19 Virus by PCR (External Result) Negative Negative   INR    Collection Time: 02/06/21  5:43 AM   Result Value Ref Range    INR 1.41 (H) 0.90 -  1.10   Basic Metabolic Panel    Collection Time: 02/08/21  9:54 AM   Result Value Ref Range    Sodium 137 136 - 145 mmol/L    Potassium 4.7 3.5 - 5.0 mmol/L    Chloride 100 98 - 107 mmol/L    CO2 26 22 - 31 mmol/L    Anion Gap, Calculation 11 5 - 18 mmol/L    Glucose 280 (H) 70 - 125 mg/dL    Calcium 9.1 8.5 - 10.5 mg/dL    BUN 20 8 - 28 mg/dL    Creatinine 1.28 0.70 - 1.30 mg/dL    GFR MDRD Af Amer >60 >60 mL/min/1.73m2    GFR MDRD Non Af Amer 52 (L) >60 mL/min/1.73m2   INR    Collection Time: 02/08/21  9:54 AM   Result Value Ref Range    INR 2.01 (H) 0.90 - 1.10   HM2(CBC w/o Differential)    Collection Time: 02/08/21  9:54 AM   Result Value Ref Range    WBC 9.5 4.0 - 11.0 thou/uL    RBC 3.83 (L) 4.40 - 6.20 mill/uL    Hemoglobin 12.5 (L) 14.0 - 18.0 g/dL    Hematocrit 38.5 (L) 40.0 - 54.0 %     (H) 80 - 100 fL    MCH 32.6 27.0 - 34.0 pg    MCHC 32.5 32.0 - 36.0 g/dL    RDW 12.1 11.0 - 14.5 %    Platelets 316 140 - 440 thou/uL    MPV 10.3 8.5 - 12.5 fL   Magnesium    Collection Time: 02/08/21  9:54 AM   Result Value Ref Range    Magnesium 1.9 1.8 - 2.6 mg/dL              Electronically signed by  GUNNAR Boucher

## 2021-06-15 NOTE — PROGRESS NOTES
"  [facility]      NAME:  Burke Recinos             :  1927    MRN: [id number]    CODE STATUS:  DNR    FACILITY: [facility] [[id number]]         CHIEF COMPLAIN/REASON FOR VISIT:  Chief Complaint   Patient presents with     Review Of Multiple Medical Conditions     new admission, abd surgery       HISTORY OF PRESENT ILLNESS: Burke Recinos is a 89 y.o. male being seen at the request of the nurses to initiate care with pt. He likes to go by the name Jaime . Jaime comes from [facility], DOS 21 TO 21. Per his EMR:  \"with a history of adenocarcinoma of colon, s/p laparoscopic right hemicolectomy (), Type II DM, HLD, CAD, A-Fib, s/p pacemaker placement (), polymyalgia rheumatica, osteoarthritis, iron deficiency anemia, GERD, BPH, and smoking who was admitted on 2021 after presenting to the [facility] ED for evaluation of LUQ/epigastric abdominal pain for 5 days.     ED Course  In the ED, VS were wnl. Labs pertinent for glucose of 239, WBC of 13.2, RBC of 3.48, and hemoglobin of 11.7. EKG significant for atrial fibrillation and septal infarct. CT Abdomen Pelvis W showed interval development of a 3.7 x 4.8 cm abscess within the upper anterior peritoneal cavity just below the abdominal wall and located inferior to the transverse colon above a couple loops of small bowel. The patient was given IV fluids in the ED and was admitted for further evaluation and management. Patient was started on antibiotics.     Ir was consulted and patient is s/p drain placement. IR recommending repeat CT abdomen in 7-10 days. Also seen by crs and they recommended antibiotics , drain . Cytology of fluid was negative for malignancy. CRS recommended outpatient follow up. Cytology report as below      1. Marked acute inflammation compatible with abscess   2. Negative for cytologically malignant cells in this sample     Fluid culture grew e coli and strep. Infectious Disease consult " requested. Fluid amylase ruling out active leak from gut. Infectious Disease recommended Augmentin and repeat abscessogram in few days. Per Infectious Disease recommendations patient will continue antibiotics for 7 days after drain is removed( 14 days prescription was done but might need longer duration of antibiotics if drain is in longer).  He will have SN monitor his chronic medical conditions with drain management and PT/OT for therapies. We did discuss ACP and [place] reports his desire is DNR, we reviewed POLST and signed today as well.          Allergies   Allergen Reactions     Vancomycin      Other reaction(s): George Syndrome  Patient may receive vancomycin at rate that is half normal rate     Aspirin Other (See Comments)     Relative contraindication due to oral anticoagulation.     Metformin Diarrhea     Tamsulosin Other (See Comments)     renal failure     Niacin Rash   :     Current Outpatient Medications   Medication Sig     acetaminophen (TYLENOL) 650 MG CR tablet Take 650 mg by mouth every 4 (four) hours as needed for pain.     amoxicillin-clavulanate (AUGMENTIN) 250-125 mg per tablet Take 1 tablet by mouth 3 (three) times a day. for 14 Days     atorvastatin (LIPITOR) 40 MG tablet Take 40 mg by mouth at bedtime.     cholecalciferol, vitamin D3, 1,000 unit (25 mcg) tablet Take 2,000 Units by mouth daily.     DULoxetine (CYMBALTA) 60 MG capsule Take 60 mg by mouth daily.     ferrous sulfate 325 (65 FE) MG tablet Take 1 tablet by mouth daily with breakfast.     finasteride (PROSCAR) 5 mg tablet Take 5 mg by mouth daily.     furosemide (LASIX) 20 MG tablet Take 20 mg by mouth 2 (two) times a week. every Mon, Thurs     glipiZIDE (GLUCOTROL XL) 2.5 MG 24 hr tablet Take 2.5 mg by mouth daily.     insulin aspart U-100 (NOVOLOG) 100 unit/mL (3 mL) injection pen Inject under the skin 3 (three) times a day before meals. Inject as per sliding scale:  if 200 - 249 = 1 unit;  250 - 299 = 2 unit;  300 - 349 = 3  unit;  350 - 399 = 4 unit;  400+ = 5 units And call MD,subcutaneously three times a day     loperamide (IMODIUM) 2 mg capsule Take 2 mg by mouth as needed for diarrhea (4mg with first loose stool. Max of 16mg in 24hours).     magnesium oxide (MAG-OX) 400 mg (241.3 mg magnesium) tablet Take 400 mg by mouth 2 (two) times a day.      metoprolol succinate (TOPROL-XL) 25 MG Take 25 mg by mouth daily. AND give 50 mg by mouth daily     multivitamin with minerals (MULTIPLE VITAMIN-MINERALS) tablet Take 1 tablet by mouth daily.     omega 3-dha-epa-fish oil 1,000 mg (120 mg-180 mg) cap Take 1 capsule by mouth daily.     oxyCODONE (ROXICODONE) 5 MG immediate release tablet Take 2.5 mg by mouth every 4 (four) hours as needed for pain.     pantoprazole (PROTONIX) 40 MG tablet Take 40 mg by mouth daily.     warfarin sodium (WARFARIN ORAL) Take 3 mg by mouth at bedtime.          REVIEW OF SYSTEMS:    Currently, no fever, chills, or rigors. Does not have any visual or hearing problems. Denies any chest pain, headaches, palpitations, lightheadedness, dizziness, shortness of breath, or cough. Appetite is good. Denies any GERD symptoms. Denies any difficulty with swallowing, nausea, or vomiting.  Denies any abdominal pain, diarrhea or constipation. Denies any urinary symptoms. No insomnia. No active bleeding. No rash.       PHYSICAL EXAMINATION:  Vitals:    02/06/21 1413   BP: 136/72   Pulse: 80   Temp: 98.1  F (36.7  C)   Weight: 203 lb (92.1 kg)         GENERAL: Awake, Alert, oriented x3, not in any form of acute distress, answers questions appropriately, follows simple commands, conversant  HEENT: Head is normocephalic with balding hair distribution. No evidence of trauma. Ears: No acute purulent discharge. Eyes: Conjunctivae pink with no scleral jaundice. Nose: Normal mucosa and septum. NECK: Supple with no cervical or supraclavicular lymphadenopathy. Trachea is midline.   ABDOMEN: Rotund and semi soft, a bit tender to palpation,   no masses, no organomegaly, good bowel sounds,Dressings in place and LE bulb noted with rubra drainage  EXTREMITIES: Atraumatic. Full range of motion on both upper and lower extremities, there is no tenderness to palpation, no pedal edema, no cyanosis or clubbing, no calf tenderness, normal cap refill, no joint swelling.  SKIN: Warm and dry, no erythema noted, no rashes or lesions.  NEUROLOGICAL: The patient is oriented to person, place and time. Strength and sensation are grossly intact. Face is symmetric.          Social Distancing and PPE utilized for todays assessment due to Covid 19 precautions.          LABS:    Lab Results   Component Value Date    WBC 8.3 10/21/2020    HGB 13.3 (L) 10/21/2020    HCT 40.3 10/21/2020     10/21/2020     10/21/2020       Results for orders placed or performed in visit on 10/28/20   Basic Metabolic Panel   Result Value Ref Range    Sodium 137 136 - 145 mmol/L    Potassium 3.9 3.5 - 5.0 mmol/L    Chloride 100 98 - 107 mmol/L    CO2 28 22 - 31 mmol/L    Anion Gap, Calculation 9 5 - 18 mmol/L    Glucose 220 (H) 70 - 125 mg/dL    Calcium 9.1 8.5 - 10.5 mg/dL    BUN 27 8 - 28 mg/dL    Creatinine 1.31 (H) 0.70 - 1.30 mg/dL    GFR MDRD Af Amer >60 >60 mL/min/1.73m2    GFR MDRD Non Af Amer 51 (L) >60 mL/min/1.73m2           Lab Results   Component Value Date    HGBA1C 7.7 (H) 10/07/2020     Vitamin D, Total (25-Hydroxy)   Date Value Ref Range Status   10/07/2020 24.2 (L) 30.0 - 80.0 ng/mL Final     Lab Results   Component Value Date    [id number] 10/07/2020       ASSESSMENT/PLAN:  1. Abdominal visceral abscess (H)    2. Chronic atrial fibrillation (H)    3. Essential hypertension    4. Type 2 diabetes mellitus without complication, without long-term current use of insulin (H)    5. ACP (advance care planning)        1. Abdominal Absess: Augmentin ordered until 2/18, he follows with GI on 2/18 apt. Nursing to completed wound care/dressing changes to abdomen, observe for  any s/sx infection to sites. LE drain care and monitoring his ouputs.  PT/OT for safety and strengthening while on TCU    2. Afib: INR scheduled for 2/6 for coumadin dosing. Education on diet and observing for s/sx of bleeding reviewed. Assessment revealed no excessive  bleeding or bruising observed and denies melena and heaturia.    3. HTN: See above med list. Pt with Metaprel and lasix. SN to manage VS and report abnormal readings to PCP. PCP will review BP readings Q visit.    4. DM; SN to monitor for hyper/hypoglycemia. On glipizide daily with sliding scale insulin    5: ACP: Pt discussion on his code status including POLST signing today for DNR status    Electronically signed by:  Daisy Bond CNP  This progress note was completed using Dragon software and there may be grammatical errors.      55  minutes spent of which greater than 60% was face to face communication with the patient about above plan of care including MCS role in his care, TCU routines with therapy focus for strengthening and safety, SN services to manage chronic medical conditions as well as drain management of LE blb and VS monitoring. We discussed ACP and POLST signing as well,

## 2021-06-15 NOTE — PROGRESS NOTES
"  [facility]      NAME:  Burke Recinos             :  1927    MRN: [id number]    CODE STATUS:  DNR    FACILITY: [facility] [[id number]]         CHIEF COMPLAIN/REASON FOR VISIT:  Chief Complaint   Patient presents with     Problem Visit     clogged eagle bulb       HISTORY OF PRESENT ILLNESS: Burke Recinos is a 89 y.o. male being seen at the urgent  request of the nurses to assess clogged EAGLE bulb. I was unable to flush, no drainage in bulb, Suggestive of possible dislodgment.  He likes to go by the name Jaime . Jaime comes from [facility], DOS 21 TO 21. Per his EMR:  \"with a history of adenocarcinoma of colon, s/p laparoscopic right hemicolectomy (), Type II DM, HLD, CAD, A-Fib, s/p pacemaker placement (), polymyalgia rheumatica, osteoarthritis, iron deficiency anemia, GERD, BPH, and smoking who was admitted on 2021 after presenting to the [facility] ED for evaluation of LUQ/epigastric abdominal pain for 5 days.     ED Course  In the ED, VS were wnl. Labs pertinent for glucose of 239, WBC of 13.2, RBC of 3.48, and hemoglobin of 11.7. EKG significant for atrial fibrillation and septal infarct. CT Abdomen Pelvis W showed interval development of a 3.7 x 4.8 cm abscess within the upper anterior peritoneal cavity just below the abdominal wall and located inferior to the transverse colon above a couple loops of small bowel. The patient was given IV fluids in the ED and was admitted for further evaluation and management. Patient was started on antibiotics.     Ir was consulted and patient is s/p drain placement. IR recommending repeat CT abdomen in 7-10 days. Also seen by crs and they recommended antibiotics , drain . Cytology of fluid was negative for malignancy. CRS recommended outpatient follow up. Cytology report as below      1. Marked acute inflammation compatible with abscess   2. Negative for cytologically malignant cells in this sample     Fluid culture " grew e coli and strep. Infectious Disease consult requested. Fluid amylase ruling out active leak from gut. Infectious Disease recommended Augmentin and repeat abscessogram in few days. Per Infectious Disease recommendations patient will continue antibiotics for 7 days after drain is removed( 14 days prescription was done but might need longer duration of antibiotics if drain is in longer).  He will have CBC, BMP, MG as well as INR drawn today. On coumadin for AFIb, no excessive bruising or bleeding noted. He is on oral ABX monitor INR closely          Allergies   Allergen Reactions     Vancomycin      Other reaction(s): George Syndrome  Patient may receive vancomycin at rate that is half normal rate     Aspirin Other (See Comments)     Relative contraindication due to oral anticoagulation.     Metformin Diarrhea     Tamsulosin Other (See Comments)     renal failure     Niacin Rash   :     Current Outpatient Medications   Medication Sig     acetaminophen (TYLENOL) 650 MG CR tablet Take 650 mg by mouth every 4 (four) hours as needed for pain.     amoxicillin-clavulanate (AUGMENTIN) 250-125 mg per tablet Take 1 tablet by mouth 3 (three) times a day. for 14 Days     atorvastatin (LIPITOR) 40 MG tablet Take 40 mg by mouth at bedtime.     cholecalciferol, vitamin D3, 1,000 unit (25 mcg) tablet Take 2,000 Units by mouth daily.     DULoxetine (CYMBALTA) 60 MG capsule Take 60 mg by mouth daily.     ferrous sulfate 325 (65 FE) MG tablet Take 1 tablet by mouth daily with breakfast.     finasteride (PROSCAR) 5 mg tablet Take 5 mg by mouth daily.     furosemide (LASIX) 20 MG tablet Take 20 mg by mouth 2 (two) times a week. every Mon, Thurs     glipiZIDE (GLUCOTROL XL) 2.5 MG 24 hr tablet Take 2.5 mg by mouth daily.     insulin aspart U-100 (NOVOLOG) 100 unit/mL (3 mL) injection pen Inject under the skin 3 (three) times a day before meals. Inject as per sliding scale:  if 200 - 249 = 1 unit;  250 - 299 = 2 unit;  300 - 349 = 3  unit;  350 - 399 = 4 unit;  400+ = 5 units And call MD,subcutaneously three times a day     loperamide (IMODIUM) 2 mg capsule Take 2 mg by mouth as needed for diarrhea (4mg with first loose stool. Max of 16mg in 24hours).     magnesium oxide (MAG-OX) 400 mg (241.3 mg magnesium) tablet Take 400 mg by mouth 2 (two) times a day.      metoprolol succinate (TOPROL-XL) 25 MG Take 25 mg by mouth daily. AND give 50 mg by mouth daily     multivitamin with minerals (MULTIPLE VITAMIN-MINERALS) tablet Take 1 tablet by mouth daily.     omega 3-dha-epa-fish oil 1,000 mg (120 mg-180 mg) cap Take 1 capsule by mouth daily.     oxyCODONE (ROXICODONE) 5 MG immediate release tablet Take 2.5 mg by mouth every 4 (four) hours as needed for pain.     pantoprazole (PROTONIX) 40 MG tablet Take 40 mg by mouth daily.     warfarin sodium (WARFARIN ORAL) Take 3 mg by mouth at bedtime.          REVIEW OF SYSTEMS:    Currently, no fever, chills, or rigors. Does not have any visual or hearing problems. Denies any chest pain, headaches, palpitations, lightheadedness, dizziness, shortness of breath, or cough. Appetite is good. Denies any GERD symptoms. Denies any difficulty with swallowing, nausea, or vomiting.  Denies any abdominal pain, diarrhea or constipation. Denies any urinary symptoms. No insomnia. No active bleeding. No rash.       PHYSICAL EXAMINATION:  Vitals:    02/08/21 1343   BP: (!) 163/94   Pulse: 81   Temp: 98.3  F (36.8  C)   Weight: 202 lb (91.6 kg)         GENERAL: Awake, Alert, oriented x3, not in any form of acute distress, answers questions appropriately, follows simple commands, conversant  HEENT: Head is normocephalic with balding hair distribution. No evidence of trauma. Ears: No acute purulent discharge. Eyes: Conjunctivae pink with no scleral jaundice. Nose: Normal mucosa and septum. NECK: Supple with no cervical or supraclavicular lymphadenopathy. Trachea is midline.   ABDOMEN: Rotund and semi soft, a bit tender to  palpation,  no masses, no organomegaly, good bowel sounds,Dressings in place and LE bulb noted with no drainage, site with crusted drainage and redness.  EXTREMITIES: Atraumatic. Full range of motion on both upper and lower extremities, there is no tenderness to palpation, no pedal edema, no cyanosis or clubbing, no calf tenderness, normal cap refill, no joint swelling.  SKIN: Warm and dry, no erythema noted, no rashes or lesions.  NEUROLOGICAL: The patient is oriented to person, place and time. Strength and sensation are grossly intact. Face is symmetric.          Social Distancing and PPE utilized for todays assessment due to Covid 19 precautions.          LABS:    Lab Results   Component Value Date    WBC 8.3 10/21/2020    HGB 13.3 (L) 10/21/2020    HCT 40.3 10/21/2020     10/21/2020     10/21/2020       Results for orders placed or performed in visit on 10/28/20   Basic Metabolic Panel   Result Value Ref Range    Sodium 137 136 - 145 mmol/L    Potassium 3.9 3.5 - 5.0 mmol/L    Chloride 100 98 - 107 mmol/L    CO2 28 22 - 31 mmol/L    Anion Gap, Calculation 9 5 - 18 mmol/L    Glucose 220 (H) 70 - 125 mg/dL    Calcium 9.1 8.5 - 10.5 mg/dL    BUN 27 8 - 28 mg/dL    Creatinine 1.31 (H) 0.70 - 1.30 mg/dL    GFR MDRD Af Amer >60 >60 mL/min/1.73m2    GFR MDRD Non Af Amer 51 (L) >60 mL/min/1.73m2           Lab Results   Component Value Date    HGBA1C 7.7 (H) 10/07/2020     Vitamin D, Total (25-Hydroxy)   Date Value Ref Range Status   10/07/2020 24.2 (L) 30.0 - 80.0 ng/mL Final     Lab Results   Component Value Date    [id number] 777 10/07/2020       ASSESSMENT/PLAN:  1. Colon obstruction (H)    2. Abdominal visceral abscess (H)    3. Chronic atrial fibrillation (H)        1/2. Colon obstruction with Abdominal Absess: Augmentin ordered until 2/18, he follows with GI on 2/18 apt, however LE bulb obstructed. Call out to surgeon to see if IR should replace or just take out as due to be in until at east 2/18/21.  PT/OT for safety and strengthening while on TCU    2. Afib: INR scheduled for today  for coumadin dosing. Education on diet and observing for s/sx of bleeding reviewed. Assessment revealed no excessive  bleeding or bruising observed and denies melena and heaturia.    3. HTN: See above med list. Pt with Metaprel and lasix. SN to manage VS and report abnormal readings to PCP. PCP will review BP readings Q visit.    4. DM; SN to monitor for hyper/hypoglycemia. On glipizide daily with sliding scale insulin    5: ACP: Pt discussion on his code status including POLST signing today for DNR status    Electronically signed by:  Daisy Bond CNP  This progress note was completed using Dragon software and there may be grammatical errors.      45  minutes spent of which greater than 60% was face to face communication with the patient and son Natalio about above plan of care including MCS role in his care,  clogged drain management of LE bulb and awaiting IR call back from surgeon for new orders to see if bilb can come out or if he needs replacemnt from IR.

## 2021-06-16 PROBLEM — R07.9 CHEST PAIN: Status: ACTIVE | Noted: 2020-01-20

## 2021-06-16 PROBLEM — E83.42 HYPOMAGNESEMIA: Status: ACTIVE | Noted: 2020-01-24

## 2021-06-16 PROBLEM — R79.89 LOW VITAMIN B12 LEVEL: Status: ACTIVE | Noted: 2020-01-24

## 2021-06-16 PROBLEM — E87.5 HYPERKALEMIA: Status: ACTIVE | Noted: 2019-03-25

## 2021-06-16 PROBLEM — I25.10 CORONARY ARTERY DISEASE: Status: ACTIVE | Noted: 2021-02-04

## 2021-06-16 PROBLEM — K56.609 COLON OBSTRUCTION (H): Status: ACTIVE | Noted: 2019-03-27

## 2021-06-16 PROBLEM — K21.9 GASTROESOPHAGEAL REFLUX DISEASE WITHOUT ESOPHAGITIS: Status: ACTIVE | Noted: 2021-02-04

## 2021-06-16 PROBLEM — Z85.038 HISTORY OF MALIGNANT NEOPLASM OF COLON: Status: ACTIVE | Noted: 2021-02-04

## 2021-06-16 PROBLEM — H90.3 SENSORINEURAL HEARING LOSS (SNHL) OF BOTH EARS: Status: ACTIVE | Noted: 2021-02-04

## 2021-06-16 PROBLEM — N40.1 BENIGN PROSTATIC HYPERPLASIA WITH POST-VOID DRIBBLING: Status: ACTIVE | Noted: 2017-12-12

## 2021-06-16 PROBLEM — E11.42 DIABETIC PERIPHERAL NEUROPATHY (H): Status: ACTIVE | Noted: 2017-12-12

## 2021-06-16 PROBLEM — I48.20 CHRONIC ATRIAL FIBRILLATION (H): Status: ACTIVE | Noted: 2019-03-25

## 2021-06-16 PROBLEM — N18.30 CHRONIC KIDNEY DISEASE, STAGE 3 (H): Status: ACTIVE | Noted: 2021-02-09

## 2021-06-16 PROBLEM — R41.3 MEMORY PROBLEM: Status: ACTIVE | Noted: 2019-05-01

## 2021-06-16 PROBLEM — K65.1 ABDOMINAL VISCERAL ABSCESS (H): Status: ACTIVE | Noted: 2021-01-31

## 2021-06-16 PROBLEM — I50.9 CHF (CONGESTIVE HEART FAILURE) (H): Status: ACTIVE | Noted: 2020-01-20

## 2021-06-16 PROBLEM — C18.9 ADENOCARCINOMA OF COLON (H): Status: ACTIVE | Noted: 2019-03-13

## 2021-06-16 PROBLEM — D50.9 IRON DEFICIENCY ANEMIA: Status: ACTIVE | Noted: 2019-01-29

## 2021-06-16 PROBLEM — N39.43 BENIGN PROSTATIC HYPERPLASIA WITH POST-VOID DRIBBLING: Status: ACTIVE | Noted: 2017-12-12

## 2021-06-16 PROBLEM — D64.9 CHRONIC ANEMIA: Status: ACTIVE | Noted: 2021-02-04

## 2021-06-16 PROBLEM — R26.89 POOR BALANCE: Status: ACTIVE | Noted: 2017-12-12

## 2021-06-16 PROBLEM — E11.9 TYPE 2 DIABETES MELLITUS WITHOUT COMPLICATION (H): Status: ACTIVE | Noted: 2019-12-05

## 2021-06-16 PROBLEM — R53.81 MALAISE AND FATIGUE: Status: ACTIVE | Noted: 2020-01-20

## 2021-06-16 PROBLEM — R53.83 MALAISE AND FATIGUE: Status: ACTIVE | Noted: 2020-01-20

## 2021-06-19 NOTE — LETTER
Letter by Mary Ann Chang MBBS at      Author: Mary Ann Chang MBBS Service: -- Author Type: --    Filed:  Encounter Date: 3/19/2019 Status: (Other)         Patient: Burke Recinos   MR Number: [id number]   YOB: 1927   Date of Visit: 3/19/2019       [facility] Admission note      Patient: Burke Recinos  MRN: [id number]  Date of Service: 3/19/2019      [facility] [[id number]]  Reason for Visit     Chief Complaint   Patient presents with   ? H & P       Code Status     DNR/DNI    Assessment     Status post laparoscopic right hemicolectomy on 3/9/2019 secondary to bowel obstruction  Adenocarcinoma of the colon  Acute hyperkalemia with a potassium of 5.1 in the TCU.  Acute hypomagnesemia with a magnesium of 1.6 on recheck  Tachycardia on a higher dose of metoprolol  History of orthostatic hypotension in the past  Atrial fibrillation. inr 2.1  Anemia suspected to be blood loss with a hemoglobin of 10.8 on recheck in the TCU  Status post permanent pacemaker  Diabetes type 2  ANGELITO  Gait instability with generalized weakness  Cognitive impairment SLUMS 14/30    Plan     Patient underwent laparoscopic right hemicolectomy and did well postoperatively.  Pain management to be optimized.  Low fiber diet recommended for him.  Outpatient follow-up with medical oncology  He is currently discharged on a higher dose of metoprolol due to tachycardia concerns in the setting of atrial fibrillation.  He also continues on warfarin for anticoagulation.  Monitor blood pressures closely due to previous history of orthostatic hypotension  Patient discharged with blood sugar checks so far his blood sugars have been stable <160  Discharged on oral glipizide.  Start on magnesium supplement 400 daily.  She is not on potassium supplementation so we will recheck a BMP in 2 days.  Start on iron supplementation daily due to anemia concerns family has been concerned about iron.  Discontinue fish oil  supplementation due to polypharmacy concern.  Recheck BMP in 2 days.  Discontinue blood sugar checks with my next visit if blood sugars remain stable  Monitor cognitive function slums is 14/30    History     Patient is a very pleasant 89 y.o. male who is admitted to TCU.  Patient was admitted with abdominal pain associated with diarrhea.  CT revealed focal wall thickening and narrowing of the right colon which was suggestive of a neoplasm resulting in a bowel obstruction.  He was admitted and underwent a laparoscopic right hemicolectomy on 3/9/2019  Postoperatively pathology report shows moderately differentiated adenocarcinoma.  He has been discharged to the TCU with advice to follow-up with medical oncology.  While in the hospital he had tachycardia.  He was given a higher dose of metoprolol.  In the past he has not been able to tolerate a high dose of metoprolol due to orthostatic hypotension and will require close monitoring in the TCU.  He also had congestive heart failure with volume overload and has been given IV Lasix in the hospital fluid status will also need to be monitored  He was reporting significant weakness with gait abnormality and has been discharged to the TCU for strengthening  He is also diabetic he has been discharged on oral glipizide.  Blood sugars were stable continue to monitor blood sugar in the TCU has been recommended for him.    Past Medical History       Past Medical History:   Diagnosis Date   ? Adenocarcinoma of colon (H)    ? Atrial fibrillation (H)    ? BPH (benign prostatic hyperplasia)    ? Cardiovascular disease    ? Colon obstruction (H)    ? Diverticulosis of colon    ? DM2 (diabetes mellitus, type 2) (H)    ? GERD (gastroesophageal reflux disease)    ? HLD (hyperlipidemia)    ? Iron deficiency anemia    ? Obstructive sleep apnea    ? Osteoarthrosis        Past Social History     Reviewed, and he  reports that he quit smoking about 49 years ago. His smoking use included  cigars. he has never used smokeless tobacco. He reports that he drinks alcohol.    Family History     Reviewed, and family history includes Alcohol abuse in his father; Breast cancer in his mother; Cancer in his brother.    Medication List     Current Outpatient Medications on File Prior to Visit   Medication Sig Dispense Refill   ? acetaminophen (TYLENOL) 325 MG tablet Take 650 mg by mouth every 4 (four) hours as needed for pain.     ? atorvastatin (LIPITOR) 40 MG tablet Take 40 mg by mouth at bedtime.     ? cyanocobalamin 1000 MCG tablet Take 1,000 mcg by mouth daily.     ? DULoxetine (CYMBALTA) 60 MG capsule Take 60 mg by mouth daily.     ? glipiZIDE (GLUCOTROL) 10 MG tablet Take 10 mg by mouth 2 (two) times a day before meals.     ? metoprolol succinate (TOPROL-XL) 25 MG Take 25 mg by mouth 2 (two) times a day.     ? multivitamin (MULTIPLE VITAMINS ORAL) Take 1 tablet by mouth daily.     ? nitroglycerin (NITROSTAT) 0.4 MG SL tablet Place 0.4 mg under the tongue every 5 (five) minutes as needed for chest pain.     ? omega-3 fatty acids 1,000 mg cap Take 1 capsule by mouth daily.       No current facility-administered medications on file prior to visit.        Allergies     Allergies   Allergen Reactions   ? Vancomycin      Other reaction(s): George Syndrome  Patient may receive vancomycin at rate that is half normal rate   ? Aspirin Other (See Comments)     Relative contraindication due to oral anticoagulation.   ? Metformin Diarrhea   ? Tamsulosin Other (See Comments)     renal failure   ? Niacin Rash       Review of Systems   A comprehensive review of 14 systems was done. Pertinent findings noted here and in history of present illness. All the rest negative.  Constitutional: Negative.  Negative for fever, chills, he has activity change, appetite change and fatigue.   HENT: Negative for congestion and facial swelling.    Eyes: Negative for photophobia, redness and visual disturbance.   Respiratory: Negative for  cough and chest tightness.    Cardiovascular: Negative for chest pain, palpitations and leg swelling.   Gastrointestinal: Negative for nausea, diarrhea, constipation, blood in stool and abdominal distention.   Genitourinary: Negative.    Musculoskeletal: Negative.  reporting some limited endurance  Skin: Negative.    Neurological: Negative for dizziness, tremors, syncope, weakness, light-headedness and headaches.   Hematological: Does not bruise/bleed easily.   Psychiatric/Behavioral: Negative.  family concerned about cognitive impairment      Physical Exam     Wt 203lb ; bp 123/73 ; t98 p81    Constitutional: Oriented to person, place, and time and appears well-developed. obese  HEENT:  Normocephalic and atraumatic.  Eyes: Conjunctivae and EOM are normal. Pupils are equal, round, and reactive to light. No discharge.  No scleral icterus. Nose normal. Mouth/Throat: Oropharynx is clear and moist. No oropharyngeal exudate.    NECK: Normal range of motion. Neck supple. No JVD present. No tracheal deviation present. No thyromegaly present.   CARDIOVASCULAR: Normal rate, regular rhythm and intact distal pulses.  Exam reveals no gallop and no friction rub.  Systolic murmur present.  Pacemaker present  PULMONARY: Effort normal and breath sounds normal. No respiratory distress.No Wheezing or rales.  ABDOMEN: Soft. Bowel sounds are normal. No distension and no mass.  There is no tenderness. There is no rebound and no guarding. No HSM.  Midline incision is intact  MUSCULOSKELETAL: Normal range of motion. Trace  edema and no tenderness. Mild kyphosis, no tenderness.  LYMPH NODES: Has no cervical, supraclavicular, axillary and groin adenopathy.   NEUROLOGICAL: Alert and oriented to person, place, and time. No cranial nerve deficit.  Normal muscle tone. Coordination normal.   GENITOURINARY: Deferred exam.  SKIN: Skin is warm and dry. No rash noted. No erythema. No pallor.   EXTREMITIES: No cyanosis, no clubbing,  Trace edema. No  Deformity.  PSYCHIATRIC: Normal mood, affect and behavior.recall is mpaired      Lab Results       Lab Results   Component Value Date    BUN 13 03/16/2019    CALCIUM 8.8 03/16/2019     03/16/2019    CO2 26 03/16/2019    CREATININE 1.01 03/16/2019    GFRAA >60 03/16/2019    GFRNONAA >60 03/16/2019     (H) 03/16/2019    HCT 33.8 (L) 03/16/2019    WBC 6.7 03/16/2019    HGB 10.8 (L) 03/16/2019    MG 1.6 (L) 03/16/2019     03/16/2019    K 5.1 (H) 03/16/2019     03/16/2019           Imaging Results     03/11/19  0744 03/10/19  0711 03/09/19  0656 03/08/19  2212   WBC 9.4 6.7 4.1 L 6.6   HGB 10.7 L 10.0 L 11.3 L 14.5    187 234 304   HCT 33.0 L 31.2 L 34.4 L 43.4   MCV 96 97 95 92     Recent Labs   03/11/19  0744 03/10/19  0711 03/09/19  0656 03/08/19  2212   SODIUM 137 140 141 136   POTASSIUM 3.6 4.9 4.9 4.4   CHLORIDE 99 105 106 99   IV2KFRCU 29 28 26 22   BUN 22 21 30 H 31 H   CREATININE 1.13 1.26 H 1.36 H 1.62 H   GLUCOSE 128 H 173 H 188 H 334 H   CALCIUM 8.5 8.3 L 8.9 10.5   Ct Head Brain Wo  Result Date: 3/8/2019  CONCLUSION: 1. Normal for age. 2. No CT finding of a mass, infarct or hemorrhage.    Ct Chest W  Result Date: 3/12/2019  CONCLUSION: 1. No definite evidence for metastatic disease in the chest. 2. Subpleural reticular opacities mid and lower lungs may be due to dependent atelectasis, however, mild pulmonary fibrosis could have this appearance.    Ct Abdomen Pelvis W Iv (oral Contrast = No)  Result Date: 3/8/2019  CONCLUSION: 1. Focal wall thickening and narrowing right colon highly suggestive of neoplasm resulting in bowel obstruction. 2. Small amount of free fluid. 3. Diverticulosis. 4. Left renal cysts. 5. Stenosis proximal SMA.     Xr Abdomen 1 View Portable  Result Date: 3/9/2019  FINDINGS: Nasogastric tube tip proximal stomach. Dilated loops of small intestine in the central abdomen unchanged consistent with some degree of mechanical small bowel obstruction. Gas is  present in the colon. Enterolith in the right lower quadrant.       GUNNAR Boucher

## 2021-06-19 NOTE — LETTER
Letter by Talia Rosas CNP at      Author: Talia Rosas CNP Service: -- Author Type: --    Filed:  Encounter Date: 3/15/2019 Status: (Other)         Patient: Burke Recinos   MR Number: [id number]   YOB: 1927   Date of Visit: 3/15/2019     No notes on file

## 2021-06-19 NOTE — LETTER
Letter by Mary Ann Chang MBBS at      Author: Mary Ann Chang MBBS Service: -- Author Type: --    Filed:  Encounter Date: 3/21/2019 Status: (Other)         Patient: Burke Recinos   MR Number: [id number]   YOB: 1927   Date of Visit: 3/21/2019       [facility] Admission note      Patient: Burke Recinos  MRN: [id number]  Date of Service: 3/21/2019      [facility], [place] [[id number]]  Reason for Visit     Chief Complaint   Patient presents with   ? Review Of Multiple Medical Conditions       Code Status     DNR/DNI    Assessment     Status post laparoscopic right hemicolectomy on 3/9/2019 secondary to bowel obstruction  Adenocarcinoma of the colon  Acute renal impairment with a recheck creatinine of 1.3  Acute hyperkalemia with a potassium of 5.1 in the TCU.  Remains persistently high  Acute hypomagnesemia with a magnesium of 1.6 on recheck  Tachycardia on a higher dose of metoprolol  History of orthostatic hypotension in the past  Atrial fibrillation. inr 2.1  Anemia suspected to be blood loss with a hemoglobin of 10.8 on recheck in the TCU  Status post permanent pacemaker  Diabetes type 2  ANGELITO  Gait instability with generalized weakness  Cognitive impairment SLUMS 14/30    Plan     Patient underwent laparoscopic right hemicolectomy and did well postoperatively.  Incision is healing well  He is planning to follow-up with medical oncology and his surgeon as scheduled.  Pain concerns are minimal at present.  Discontinue blood sugar checks as his blood sugars are adequately controlled.  At present he is not a candidate for tight control but all his blood sugars are under 200.  His appetite has been good with no recent weight loss noted  On iron supplementation due to mild anemia  He is currently not on any diuretics or potassium supplementation.  His potassium remains persistently high at 5.1.  He is also showing some worsening renal impairment with a creatinine of 1.3.  Plan is to  closely monitor kidney functions advised to push some fluids and recheck BMP closely again next week  Medication review done he is not on any significant diuretics or nephrotoxic agents not on any NSAIDs.  If his potassium remains persistently high on recheck ,will push some Kayexalate    History     Patient is a very pleasant 89 y.o. male who is admitted to TCU.  Patient status post hemicolectomy.  Incision was examined today and it is healing well no secondary sign of dehiscence or infection.  He is tolerating a regular diet with no dyspepsia constipation or diarrhea reported by patient.  He has a diagnosis of adenocarcinoma based on his biopsy and plans to follow medical oncology.  Patient is currently in the TCU and has been testing his cognitive delay impaired.  Apparently family is aware of those deficits.  His initial testing slums is 14/30.  He is otherwise pleasant and cooperative with cares  He is a diabetic I did review his blood sugars he has been discharged on oral glipizide blood sugars are consistently under 200 since admission  Appetite has been good.  He is ambulating well without any assist device and denies any pain    Past Medical History       Past Medical History:   Diagnosis Date   ? Adenocarcinoma of colon (H)    ? Atrial fibrillation (H)    ? BPH (benign prostatic hyperplasia)    ? Cardiovascular disease    ? Colon obstruction (H)    ? Diverticulosis of colon    ? DM2 (diabetes mellitus, type 2) (H)    ? GERD (gastroesophageal reflux disease)    ? HLD (hyperlipidemia)    ? Iron deficiency anemia    ? Obstructive sleep apnea    ? Osteoarthrosis        Past Social History     Reviewed, and he  reports that he quit smoking about 49 years ago. His smoking use included cigars. he has never used smokeless tobacco. He reports that he drinks alcohol.    Family History     Reviewed, and family history includes Alcohol abuse in his father; Breast cancer in his mother; Cancer in his  brother.    Medication List     Current Outpatient Medications on File Prior to Visit   Medication Sig Dispense Refill   ? acetaminophen (TYLENOL) 325 MG tablet Take 650 mg by mouth every 4 (four) hours as needed for pain.     ? atorvastatin (LIPITOR) 40 MG tablet Take 40 mg by mouth at bedtime.     ? cyanocobalamin 1000 MCG tablet Take 1,000 mcg by mouth daily.     ? DULoxetine (CYMBALTA) 60 MG capsule Take 60 mg by mouth daily.     ? glipiZIDE (GLUCOTROL) 10 MG tablet Take 10 mg by mouth 2 (two) times a day before meals.     ? metoprolol succinate (TOPROL-XL) 25 MG Take 25 mg by mouth 2 (two) times a day.     ? multivitamin (MULTIPLE VITAMINS ORAL) Take 1 tablet by mouth daily.     ? nitroglycerin (NITROSTAT) 0.4 MG SL tablet Place 0.4 mg under the tongue every 5 (five) minutes as needed for chest pain.     ? omega-3 fatty acids 1,000 mg cap Take 1 capsule by mouth daily.       No current facility-administered medications on file prior to visit.        Allergies     Allergies   Allergen Reactions   ? Vancomycin      Other reaction(s): George Syndrome  Patient may receive vancomycin at rate that is half normal rate   ? Aspirin Other (See Comments)     Relative contraindication due to oral anticoagulation.   ? Metformin Diarrhea   ? Tamsulosin Other (See Comments)     renal failure   ? Niacin Rash       Review of Systems   A comprehensive review of 14 systems was done. Pertinent findings noted here and in history of present illness. All the rest negative.  Constitutional: Negative.  Negative for fever, chills, he has activity change, appetite change and fatigue.   HENT: Negative for congestion and facial swelling.    Eyes: Negative for photophobia, redness and visual disturbance.   Respiratory: Negative for cough and chest tightness.    Cardiovascular: Negative for chest pain, palpitations and leg swelling.   Gastrointestinal: Negative for nausea, diarrhea, constipation, blood in stool and abdominal distention.    Genitourinary: Negative.    Musculoskeletal: Negative.  reporting some limited endurance  Skin: Negative.    Neurological: Negative for dizziness, tremors, syncope, weakness, light-headedness and headaches.   Hematological: Does not bruise/bleed easily.   Psychiatric/Behavioral: Negative.  family concerned about cognitive impairment      Physical Exam     Wt 203lb ; bp 123/73 ; t98 p81    Constitutional: Oriented to person, place, and time and appears well-developed. obese  HEENT:  Normocephalic and atraumatic.  Eyes: Conjunctivae and EOM are normal. Pupils are equal, round, and reactive to light. No discharge.  No scleral icterus. Nose normal. Mouth/Throat: Oropharynx is clear and moist. No oropharyngeal exudate.    NECK: Normal range of motion. Neck supple. No JVD present. No tracheal deviation present. No thyromegaly present.   CARDIOVASCULAR: Normal rate, regular rhythm and intact distal pulses.  Exam reveals no gallop and no friction rub.  Systolic murmur present.  Pacemaker present  PULMONARY: Effort normal and breath sounds normal. No respiratory distress.No Wheezing or rales.  ABDOMEN: Soft. Bowel sounds are normal. No distension and no mass.  There is no tenderness. There is no rebound and no guarding. No HSM.  Midline incision is intact  MUSCULOSKELETAL: Normal range of motion. Trace  edema and no tenderness. Mild kyphosis, no tenderness.  LYMPH NODES: Has no cervical, supraclavicular, axillary and groin adenopathy.   NEUROLOGICAL: Alert and oriented to person, place, and time. No cranial nerve deficit.  Normal muscle tone. Coordination normal.   GENITOURINARY: Deferred exam.  SKIN: Skin is warm and dry. No rash noted. No erythema. No pallor.   EXTREMITIES: No cyanosis, no clubbing,  Trace edema. No Deformity.  PSYCHIATRIC: Normal mood, affect and behavior.recall is mpaired      Lab Results       Recent Results (from the past 240 hour(s))   INR   Result Value Ref Range    INR 1.40 (H) 0.90 - 1.10   Basic  Metabolic Panel   Result Value Ref Range    Sodium 141 136 - 145 mmol/L    Potassium 5.1 (H) 3.5 - 5.0 mmol/L    Chloride 105 98 - 107 mmol/L    CO2 26 22 - 31 mmol/L    Anion Gap, Calculation 10 5 - 18 mmol/L    Glucose 160 (H) 70 - 125 mg/dL    Calcium 8.8 8.5 - 10.5 mg/dL    BUN 13 8 - 28 mg/dL    Creatinine 1.01 0.70 - 1.30 mg/dL    GFR MDRD Af Amer >60 >60 mL/min/1.73m2    GFR MDRD Non Af Amer >60 >60 mL/min/1.73m2   Magnesium   Result Value Ref Range    Magnesium 1.6 (L) 1.8 - 2.6 mg/dL   HM2(CBC w/o Differential)   Result Value Ref Range    WBC 6.7 4.0 - 11.0 thou/uL    RBC 3.46 (L) 4.40 - 6.20 mill/uL    Hemoglobin 10.8 (L) 14.0 - 18.0 g/dL    Hematocrit 33.8 (L) 40.0 - 54.0 %    MCV 98 80 - 100 fL    MCH 31.2 27.0 - 34.0 pg    MCHC 32.0 32.0 - 36.0 g/dL    RDW 14.0 11.0 - 14.5 %    Platelets 261 140 - 440 thou/uL    MPV 10.1 8.5 - 12.5 fL   C. difficile Toxigenic by PCR   Result Value Ref Range    C.Difficile Toxigenic by PCR Negative Negative    Ribotype 027/NAP1/B1 Presumptive Negative Presumptive Negative   INR   Result Value Ref Range    INR 2.16 (H) 0.90 - 1.10   Basic Metabolic Panel   Result Value Ref Range    Sodium 138 136 - 145 mmol/L    Potassium 5.1 (H) 3.5 - 5.0 mmol/L    Chloride 103 98 - 107 mmol/L    CO2 26 22 - 31 mmol/L    Anion Gap, Calculation 9 5 - 18 mmol/L    Glucose 270 (H) 70 - 125 mg/dL    Calcium 8.9 8.5 - 10.5 mg/dL    BUN 20 8 - 28 mg/dL    Creatinine 1.32 (H) 0.70 - 1.30 mg/dL    GFR MDRD Af Amer >60 >60 mL/min/1.73m2    GFR MDRD Non Af Amer 51 (L) >60 mL/min/1.73m2         Mary Ann Bernardo, MBBS

## 2021-06-19 NOTE — LETTER
"Letter by Daisy Bond CNP at      Author: Daisy Bond CNP Service: -- Author Type: --    Filed:  Encounter Date: 3/25/2019 Status: (Other)         Patient: Burke Recinos   MR Number: [id number]   YOB: 1927   Date of Visit: 3/25/2019       [facility]      NAME:  Burke Recinos             :  1927    MRN: [id number]    CODE STATUS:  DNR/DNI    FACILITY: [facility] [[id number]]      CHIEF COMPLAIN/REASON FOR VISIT:  Chief Complaint   Patient presents with   ? Review Of Multiple Medical Conditions       HISTORY OF PRESENT ILLNESS: Burke Recinos is a 89 y.o. male being seen for review of multiple medical conditions. PMH of of dyslipidemia, atrial fibrillation, tachybrady syndrome s/p PPM (), CAD s/p PCI (), DM type II, iron deficiency anemia, polymyalgia rheumatica, and ANGELITO who was admitted on 3/9/2019 after presenting to the ED for evaluation of abdominal pain and vomiting.   Per chart review, the patient was admitted to [facility] from -2019 for evaluation of syncope. Telemetry and head CT were normal. He was seen by PT and noted to have some gait abnormalities. He was discharged home with PT.   The patient reports coffee-ground emesis the past few days prior to admission. No diarrhea but has to \"work hard\" at his stool to get it out. Abdominal pain severe at times, particularly in the lower abdomen, suprapubic area, he was found to have a bowel obstruction. After acute stay he is at the TCU for generalized weakness and deconditioned stae. INR today at 3.61, we will hole coumadin and recheck in am. Denies any bleeding. Reports bowels moving and denies abdominal pain.            Allergies   Allergen Reactions   ? Vancomycin      Other reaction(s): George Syndrome  Patient may receive vancomycin at rate that is half normal rate   ? Aspirin Other (See Comments)     Relative contraindication due to oral anticoagulation.   ? " Metformin Diarrhea   ? Tamsulosin Other (See Comments)     renal failure   ? Niacin Rash   :     Current Outpatient Medications   Medication Sig   ? acetaminophen (TYLENOL) 325 MG tablet Take 650 mg by mouth every 4 (four) hours as needed for pain.   ? atorvastatin (LIPITOR) 40 MG tablet Take 40 mg by mouth at bedtime.   ? cyanocobalamin 1000 MCG tablet Take 1,000 mcg by mouth daily.   ? DULoxetine (CYMBALTA) 60 MG capsule Take 60 mg by mouth daily.   ? glipiZIDE (GLUCOTROL) 10 MG tablet Take 10 mg by mouth 2 (two) times a day before meals.   ? metoprolol succinate (TOPROL-XL) 25 MG Take 25 mg by mouth 2 (two) times a day.   ? multivitamin (MULTIPLE VITAMINS ORAL) Take 1 tablet by mouth daily.   ? nitroglycerin (NITROSTAT) 0.4 MG SL tablet Place 0.4 mg under the tongue every 5 (five) minutes as needed for chest pain.   ? omega-3 fatty acids 1,000 mg cap Take 1 capsule by mouth daily.         REVIEW OF SYSTEMS:    Currently, no fever, chills, or rigors. Does not have any visual or hearing problems. Denies any chest pain, headaches, palpitations, lightheadedness, dizziness, shortness of breath, or cough. Appetite is good. Denies any GERD symptoms. Denies any difficulty with swallowing, nausea, or vomiting.  Denies any abdominal pain, diarrhea or constipation. Denies any urinary symptoms. No insomnia. No active bleeding. No rash.       PHYSICAL EXAMINATION:  Vitals:    03/25/19 1352   BP: 109/53   Pulse: 70   Temp: (!) 96.3  F (35.7  C)   Weight: 203 lb (92.1 kg)         GENERAL: Awake, Alert, oriented x3, not in any form of acute distress, answers questions appropriately, follows simple commands, conversant  HEENT: Head is normocephalic with normal hair distribution. No evidence of trauma. Ears: No acute purulent discharge. Eyes: Conjunctivae pink with no scleral jaundice. Nose: Normal mucosa and septum.   CHEST: No tenderness or deformity, no crepitus  LUNG: Clear to auscultation with good chest expansion. There are  no crackles or wheezes, normal AP diameter.  BACK: No kyphosis of the thoracic spine. Symmetric, no curvature, ROM normal, no CVA tenderness, no spinal tenderness   CVS: There is good S1  S2,  rhythm is regular.  ABDOMEN: Globular and soft, nontender to palpation,  no masses, no organomegaly, good bowel sounds, no rebound or guarding, no peritoneal signs.   EXTREMITIES: Atraumatic. Full range of motion on both upper and lower extremities, there is no tenderness to palpation, no pedal edema, no cyanosis or clubbing, no calf tenderness, normal cap refill, no joint swelling.  SKIN: Warm and dry, no erythema noted, no rashes or lesions.  NEUROLOGICAL: The patient is oriented to person, place and time. Strength and sensation are grossly intact. Face is symmetric.                    LABS:    Lab Results   Component Value Date    WBC 6.7 03/16/2019    HGB 10.8 (L) 03/16/2019    HCT 33.8 (L) 03/16/2019    MCV 98 03/16/2019     03/16/2019       Results for orders placed or performed in visit on 03/21/19   Basic Metabolic Panel   Result Value Ref Range    Sodium 138 136 - 145 mmol/L    Potassium 5.1 (H) 3.5 - 5.0 mmol/L    Chloride 103 98 - 107 mmol/L    CO2 26 22 - 31 mmol/L    Anion Gap, Calculation 9 5 - 18 mmol/L    Glucose 270 (H) 70 - 125 mg/dL    Calcium 8.9 8.5 - 10.5 mg/dL    BUN 20 8 - 28 mg/dL    Creatinine 1.32 (H) 0.70 - 1.30 mg/dL    GFR MDRD Af Amer >60 >60 mL/min/1.73m2    GFR MDRD Non Af Amer 51 (L) >60 mL/min/1.73m2           No results found for: HGBA1C  No results found for: [id number]  No results found for: [id number]    ASSESSMENT/PLAN:  1. Permanent atrial fibrillation (H)    2. Hyperkalemia      1. AFIB: INR at 3.61, hold coumadin and recheck INR in am. No excessive bleeding or bruising, no melena or hematuria. Educated on shaving with electric razor vs a disposable straight edge. Report bleeding to nursi ng.    2. K+ was elevated slightly  At 5.1, recheck BMP on 3/26/19.    Continue with  current med regime and POC, while on the TCU.      Electronically signed by:  Daisy Bond CNP  This progress note was completed using Dragon software and there may be grammatical errors.

## 2021-06-19 NOTE — LETTER
Letter by Daisy Bond CNP at      Author: Daisy Bond CNP Service: -- Author Type: --    Filed:  Encounter Date: 3/27/2019 Status: (Other)         Patient: Burke Recinos   MR Number: [id number]   YOB: 1927   Date of Visit: 3/27/2019     [facility]      NAME:  Burke Recinos             :  1927  MRN: [id number]  CODE STATUS:  DNR    VISIT TYPE: DISCHARGE SUMMARY  FACILYTY: [facility] [[id number]]                    PRIMARY CARE PROVIDER: Myles Jimenez MD    DISCHARGE DIAGNOSIS:      No diagnosis found.     DISCHARGE MEDICATIONS:         Medication List           Accurate as of 3/27/19  3:01 PM. If you have any questions, ask your nurse or doctor.               CONTINUE taking these medications    acetaminophen 325 MG tablet  Commonly known as:  TYLENOL     atorvastatin 40 MG tablet  Commonly known as:  LIPITOR     cyanocobalamin 1000 MCG tablet     DULoxetine 60 MG capsule  Commonly known as:  CYMBALTA     ferrous sulfate 325 (65 FE) MG tablet     glipiZIDE 10 MG tablet  Commonly known as:  GLUCOTROL     magnesium oxide 400 mg (241.3 mg magnesium) tablet  Commonly known as:  MAG-OX     metoprolol succinate 25 MG  Commonly known as:  TOPROL-XL     MULTIPLE VITAMINS ORAL     nitroglycerin 0.4 MG SL tablet  Commonly known as:  NITROSTAT     WARFARIN ORAL        STOP taking these medications    omega-3 fatty acids 1,000 mg Cap            HISTORY OF PRESENT ILLNESS: Burke Recinos is a 89 y.o. male being seen today for a face to face visit for a dc in am.PMH of of dyslipidemia, atrial fibrillation, tachybrady syndrome s/p PPM (), CAD s/p PCI (), DM type II, iron deficiency anemia, polymyalgia rheumatica, and ANGELITO who was admitted on 3/9/2019 after presenting to the ED for evaluation of abdominal pain and vomiting.   Per chart review, the patient was admitted to [facility] from -2019 for evaluation of syncope. Telemetry and  "head CT were normal. He was seen by PT and noted to have some gait abnormalities. He was discharged home with PT.   The patient reports coffee-ground emesis the past few days prior to admission. No diarrhea but has to \"work hard\" at his stool to get it out. Abdominal pain severe at times, particularly in the lower abdomen, suprapubic and he was found to have a bowel obstruction. Came to TCU deconditioned, now stronger and having regular BM. He is dced on coumadin 2 mg daily  and will need INR f/u and PCP to dose his coumadin after dc.    SKILLED NURSING FACILITY COURSE:  During this TCU stay, patient completed all anticipated goals of therapy.      PHYSICAL EXAMINATION:    Vitals:    03/27/19 1001   BP: 126/74   Pulse: 76   Temp: (!) 96  F (35.6  C)   Weight: 203 lb (92.1 kg)         GENERAL: Awake, Alert, oriented x3, not in any form of acute distress, answers questions appropriately, follows simple commands, conversant  HEENT: Head is normocephalic with normal hair distribution. No evidence of trauma. Ears: No acute purulent discharge. Eyes: Conjunctivae pink with no scleral jaundice. Nose: Normal mucosa and septum.   CHEST: No tenderness or deformity, no crepitus  LUNG: Clear to auscultation with good chest expansion. There are no crackles or wheezes, normal AP diameter.  BACK: No kyphosis of the thoracic spine. Symmetric, no curvature, ROM normal, no CVA tenderness, no spinal tenderness   CVS: There is good S1  S2, there are no murmurs, rubs, gallops, or heaves, rhythm is regular.  ABDOMEN: Globular and soft, nontender to palpation, non distended, no masses, no organomegaly, good bowel sounds, no rebound or guarding, no peritoneal signs. Midline incision healing, well aproximated  EXTREMITIES: Atraumatic. Full range of motion on both upper and lower extremities, there is no tenderness to palpation, +1 pedal edema, no cyanosis or clubbing, no calf tenderness, normal cap refill, no joint swelling.  SKIN: Warm and " dry, no erythema noted, no rashes or lesions.  NEUROLOGICAL: The patient is oriented to person, place and time. Strength and sensation are grossly intact. Face is symmetric.      LABS:  All labs reviewed in the nursing home record.        DISCHARGE PLAN: I certify that this patient is under Dr. Chang's care, seen by the NP, and had a face-to-face encounter that meets the physician face-to-face encounter requirements on 3/27/19  The encounter was in whole, or part related to the primary reason for home health.  The Patient is homebound due to: S/P surgical bowel obstruction, and it  is taxing and it will take a considerable amount of effort for patient to leave the home. He is dependent on others for transportation.  The patient is confined to her home and needs intermittent skilled nursing, PT,OT, RN, and HHA.  The patient has been under the care of Dr. Chang/NP and Dr. Chang  initiated the establishment of the plan of care.        Patient to be followed by home care for physical therapy to eval and treat for strengthening, balance, endurance, and safety with mobility, and ambulation.  Patient to be followed by home care for occupational therapy to eval and treat for strengthening, ADL needs, adaptive equipment, and safety.  Patient to be followed by home care for nursing services for medication set up and teaching, symptom and disease processes monitoring and education.    Patient to be followed by home care for home health aid services for bathing and ADL needs.    Patient will follow up with PCP within 7 days after discharge for medication mangagment and appropriate lab studies.    Post Op visit with Dr Jacquie Hancock on 4/10 @1130 am.          Electronically signed by:  Daisy Bond CNP  This progress note was completed using Dragon software and there may be grammatical errors.      For documentation purposes, chart review, medication management, and discharge coordination of care was greater than 35 minutes

## 2021-06-21 NOTE — LETTER
"Letter by Daisy Bond CNP at      Author: Daisy Bond CNP Service: -- Author Type: --    Filed:  Encounter Date: 2021 Status: (Other)         Patient: Burke Recinos   MR Number: [id number]   YOB: 1927   Date of Visit: 2021       [facility]      NAME:  Burke Recinos             :  1927    MRN: [id number]    CODE STATUS:  DNR    FACILITY: [facility] [[id number]]         CHIEF COMPLAIN/REASON FOR VISIT:  Chief Complaint   Patient presents with   ? Problem Visit     clogged eagle bulb       HISTORY OF PRESENT ILLNESS: Burke Recinos is a 89 y.o. male being seen at the urgent  request of the nurses to assess clogged EAGLE bulb. I was unable to flush, no drainage in bulb, Suggestive of possible dislodgment.  He likes to go by the name Jaime . [place] comes from [facility], DOS 21 TO 21. Per his EMR:  \"with a history of adenocarcinoma of colon, s/p laparoscopic right hemicolectomy (), Type II DM, HLD, CAD, A-Fib, s/p pacemaker placement (), polymyalgia rheumatica, osteoarthritis, iron deficiency anemia, GERD, BPH, and smoking who was admitted on 2021 after presenting to the [facility] ED for evaluation of LUQ/epigastric abdominal pain for 5 days.     ED Course  In the ED, VS were wnl. Labs pertinent for glucose of 239, WBC of 13.2, RBC of 3.48, and hemoglobin of 11.7. EKG significant for atrial fibrillation and septal infarct. CT Abdomen Pelvis W showed interval development of a 3.7 x 4.8 cm abscess within the upper anterior peritoneal cavity just below the abdominal wall and located inferior to the transverse colon above a couple loops of small bowel. The patient was given IV fluids in the ED and was admitted for further evaluation and management. Patient was started on antibiotics.     Ir was consulted and patient is s/p drain placement. IR recommending repeat CT abdomen in 7-10 days. Also seen by crs and they recommended antibiotics " , drain . Cytology of fluid was negative for malignancy. CRS recommended outpatient follow up. Cytology report as below      1. Marked acute inflammation compatible with abscess   2. Negative for cytologically malignant cells in this sample     Fluid culture grew e coli and strep. Infectious Disease consult requested. Fluid amylase ruling out active leak from gut. Infectious Disease recommended Augmentin and repeat abscessogram in few days. Per Infectious Disease recommendations patient will continue antibiotics for 7 days after drain is removed( 14 days prescription was done but might need longer duration of antibiotics if drain is in longer).  He will have CBC, BMP, MG as well as INR drawn today. On coumadin for AFIb, no excessive bruising or bleeding noted. He is on oral ABX monitor INR closely          Allergies   Allergen Reactions   ? Vancomycin      Other reaction(s): George Syndrome  Patient may receive vancomycin at rate that is half normal rate   ? Aspirin Other (See Comments)     Relative contraindication due to oral anticoagulation.   ? Metformin Diarrhea   ? Tamsulosin Other (See Comments)     renal failure   ? Niacin Rash   :     Current Outpatient Medications   Medication Sig   ? acetaminophen (TYLENOL) 650 MG CR tablet Take 650 mg by mouth every 4 (four) hours as needed for pain.   ? amoxicillin-clavulanate (AUGMENTIN) 250-125 mg per tablet Take 1 tablet by mouth 3 (three) times a day. for 14 Days   ? atorvastatin (LIPITOR) 40 MG tablet Take 40 mg by mouth at bedtime.   ? cholecalciferol, vitamin D3, 1,000 unit (25 mcg) tablet Take 2,000 Units by mouth daily.   ? DULoxetine (CYMBALTA) 60 MG capsule Take 60 mg by mouth daily.   ? ferrous sulfate 325 (65 FE) MG tablet Take 1 tablet by mouth daily with breakfast.   ? finasteride (PROSCAR) 5 mg tablet Take 5 mg by mouth daily.   ? furosemide (LASIX) 20 MG tablet Take 20 mg by mouth 2 (two) times a week. every Mon, Thurs   ? glipiZIDE (GLUCOTROL XL) 2.5  MG 24 hr tablet Take 2.5 mg by mouth daily.   ? insulin aspart U-100 (NOVOLOG) 100 unit/mL (3 mL) injection pen Inject under the skin 3 (three) times a day before meals. Inject as per sliding scale:  if 200 - 249 = 1 unit;  250 - 299 = 2 unit;  300 - 349 = 3 unit;  350 - 399 = 4 unit;  400+ = 5 units And call MD,subcutaneously three times a day   ? loperamide (IMODIUM) 2 mg capsule Take 2 mg by mouth as needed for diarrhea (4mg with first loose stool. Max of 16mg in 24hours).   ? magnesium oxide (MAG-OX) 400 mg (241.3 mg magnesium) tablet Take 400 mg by mouth 2 (two) times a day.    ? metoprolol succinate (TOPROL-XL) 25 MG Take 25 mg by mouth daily. AND give 50 mg by mouth daily   ? multivitamin with minerals (MULTIPLE VITAMIN-MINERALS) tablet Take 1 tablet by mouth daily.   ? omega 3-dha-epa-fish oil 1,000 mg (120 mg-180 mg) cap Take 1 capsule by mouth daily.   ? oxyCODONE (ROXICODONE) 5 MG immediate release tablet Take 2.5 mg by mouth every 4 (four) hours as needed for pain.   ? pantoprazole (PROTONIX) 40 MG tablet Take 40 mg by mouth daily.   ? warfarin sodium (WARFARIN ORAL) Take 3 mg by mouth at bedtime.          REVIEW OF SYSTEMS:    Currently, no fever, chills, or rigors. Does not have any visual or hearing problems. Denies any chest pain, headaches, palpitations, lightheadedness, dizziness, shortness of breath, or cough. Appetite is good. Denies any GERD symptoms. Denies any difficulty with swallowing, nausea, or vomiting.  Denies any abdominal pain, diarrhea or constipation. Denies any urinary symptoms. No insomnia. No active bleeding. No rash.       PHYSICAL EXAMINATION:  Vitals:    02/08/21 1343   BP: (!) 163/94   Pulse: 81   Temp: 98.3  F (36.8  C)   Weight: 202 lb (91.6 kg)         GENERAL: Awake, Alert, oriented x3, not in any form of acute distress, answers questions appropriately, follows simple commands, conversant  HEENT: Head is normocephalic with balding hair distribution. No evidence of trauma.  Ears: No acute purulent discharge. Eyes: Conjunctivae pink with no scleral jaundice. Nose: Normal mucosa and septum. NECK: Supple with no cervical or supraclavicular lymphadenopathy. Trachea is midline.   ABDOMEN: Rotund and semi soft, a bit tender to palpation,  no masses, no organomegaly, good bowel sounds,Dressings in place and LE bulb noted with no drainage, site with crusted drainage and redness.  EXTREMITIES: Atraumatic. Full range of motion on both upper and lower extremities, there is no tenderness to palpation, no pedal edema, no cyanosis or clubbing, no calf tenderness, normal cap refill, no joint swelling.  SKIN: Warm and dry, no erythema noted, no rashes or lesions.  NEUROLOGICAL: The patient is oriented to person, place and time. Strength and sensation are grossly intact. Face is symmetric.          Social Distancing and PPE utilized for todays assessment due to Covid 19 precautions.          LABS:    Lab Results   Component Value Date    WBC 8.3 10/21/2020    HGB 13.3 (L) 10/21/2020    HCT 40.3 10/21/2020     10/21/2020     10/21/2020       Results for orders placed or performed in visit on 10/28/20   Basic Metabolic Panel   Result Value Ref Range    Sodium 137 136 - 145 mmol/L    Potassium 3.9 3.5 - 5.0 mmol/L    Chloride 100 98 - 107 mmol/L    CO2 28 22 - 31 mmol/L    Anion Gap, Calculation 9 5 - 18 mmol/L    Glucose 220 (H) 70 - 125 mg/dL    Calcium 9.1 8.5 - 10.5 mg/dL    BUN 27 8 - 28 mg/dL    Creatinine 1.31 (H) 0.70 - 1.30 mg/dL    GFR MDRD Af Amer >60 >60 mL/min/1.73m2    GFR MDRD Non Af Amer 51 (L) >60 mL/min/1.73m2           Lab Results   Component Value Date    HGBA1C 7.7 (H) 10/07/2020     Vitamin D, Total (25-Hydroxy)   Date Value Ref Range Status   10/07/2020 24.2 (L) 30.0 - 80.0 ng/mL Final     Lab Results   Component Value Date    [id number] 777 10/07/2020       ASSESSMENT/PLAN:  1. Colon obstruction (H)    2. Abdominal visceral abscess (H)    3. Chronic atrial  fibrillation (H)        1/2. Colon obstruction with Abdominal Absess: Augmentin ordered until 2/18, he follows with GI on 2/18 apt, however LE bulb obstructed. Call out to surgeon to see if IR should replace or just take out as due to be in until at [place] 2[place]18/21. PT/OT for safety and strengthening while on TCU    2. Afib: INR scheduled for today  for coumadin dosing. Education on diet and observing for s/sx of bleeding reviewed. Assessment revealed no excessive  bleeding or bruising observed and denies melena and heaturia.    3. HTN: See above med list. Pt with Metaprel and lasix. SN to manage VS and report abnormal readings to PCP. PCP will review BP readings Q visit.    4. DM; SN to monitor for hyper/hypoglycemia. On glipizide daily with sliding scale insulin    5: ACP: Pt discussion on his code status including POLST signing today for DNR status    Electronically signed by:  Daisy Bond CNP  This progress note was completed using Dragon software and there may be grammatical errors.      45  minutes spent of which greater than 60% was face to face communication with the patient and son Natalio about above plan of care including MCS role in his care,  clogged drain management of LE bulb and awaiting IR call back from surgeon for new orders to see if bilb can come out or if he needs replacemnt from IR.

## 2021-06-21 NOTE — LETTER
"Letter by Daisy Bond CNP at      Author: Daisy Bond CNP Service: -- Author Type: --    Filed:  Encounter Date: 2/10/2021 Status: (Other)         Patient: Burke Recinos   MR Number: [id number]   YOB: 1927   Date of Visit: 2/10/2021       [facility]      NAME:  Burke Recinos             :  1927    MRN: [id number]    CODE STATUS:  DNR    FACILITY: [facility] [[id number]]         CHIEF COMPLAIN/REASON FOR VISIT:  Chief Complaint   Patient presents with   ? Review Of Multiple Medical Conditions     abdomen assessment       HISTORY OF PRESENT ILLNESS: Burke Recinos is a 89 y.o. male being seen today for follow-up abdominal assessment s/p LE removed from abdomen.  He likes to go by the name Jaime . Jaime comes from [facility], DOS 21 TO 21. Per his EMR:  \"with a history of adenocarcinoma of colon, s/p laparoscopic right hemicolectomy (), Type II DM, HLD, CAD, A-Fib, s/p pacemaker placement (), polymyalgia rheumatica, osteoarthritis, iron deficiency anemia, GERD, BPH, and smoking who was admitted on 2021 after presenting to the [facility] ED for evaluation of LUQ/epigastric abdominal pain for 5 days.     ED Course  In the ED, VS were wnl. Labs pertinent for glucose of 239, WBC of 13.2, RBC of 3.48, and hemoglobin of 11.7. EKG significant for atrial fibrillation and septal infarct. CT Abdomen Pelvis W showed interval development of a 3.7 x 4.8 cm abscess within the upper anterior peritoneal cavity just below the abdominal wall and located inferior to the transverse colon above a couple loops of small bowel. The patient was given IV fluids in the ED and was admitted for further evaluation and management. Patient was started on antibiotics.     Ir was consulted and patient is s/p drain placement. IR recommending repeat CT abdomen in 7-10 days. Also seen by crs and they recommended antibiotics , drain . Cytology of fluid was negative for " malignancy. CRS recommended outpatient follow up. Cytology report as below      1. Marked acute inflammation compatible with abscess   2. Negative for cytologically malignant cells in this sample     Fluid culture grew e coli and strep. Infectious Disease consult requested. Fluid amylase ruling out active leak from gut. Infectious Disease recommended Augmentin and repeat abscessogram in few days. Per Infectious Disease recommendations patient will continue antibiotics for 7 days after drain is removed( 14 days prescription was done but might need longer duration of antibiotics if drain is in longer).  He is in very good spirits and ambulating with walker and staff. Abdomen has been assessed w/o remarkable concerns.    Allergies   Allergen Reactions   ? Vancomycin      Other reaction(s): George Syndrome  Patient may receive vancomycin at rate that is half normal rate   ? Aspirin Other (See Comments)     Relative contraindication due to oral anticoagulation.   ? Metformin Diarrhea   ? Tamsulosin Other (See Comments)     renal failure   ? Niacin Rash   :     Current Outpatient Medications   Medication Sig   ? acetaminophen (TYLENOL) 650 MG CR tablet Take 650 mg by mouth every 4 (four) hours as needed for pain.   ? amoxicillin-clavulanate (AUGMENTIN) 250-125 mg per tablet Take 1 tablet by mouth 3 (three) times a day. for 14 Days   ? atorvastatin (LIPITOR) 40 MG tablet Take 40 mg by mouth at bedtime.   ? cholecalciferol, vitamin D3, 1,000 unit (25 mcg) tablet Take 2,000 Units by mouth daily.   ? DULoxetine (CYMBALTA) 60 MG capsule Take 60 mg by mouth daily.   ? ferrous sulfate 325 (65 FE) MG tablet Take 1 tablet by mouth daily with breakfast.   ? finasteride (PROSCAR) 5 mg tablet Take 5 mg by mouth daily.   ? furosemide (LASIX) 20 MG tablet Take 20 mg by mouth 2 (two) times a week. every Mon, Thurs   ? glipiZIDE (GLUCOTROL XL) 2.5 MG 24 hr tablet Take 2.5 mg by mouth daily.   ? insulin aspart U-100 (NOVOLOG) 100 unit/mL  (3 mL) injection pen Inject under the skin 3 (three) times a day before meals. Inject as per sliding scale:  if 200 - 249 = 1 unit;  250 - 299 = 2 unit;  300 - 349 = 3 unit;  350 - 399 = 4 unit;  400+ = 5 units And call MD,subcutaneously three times a day   ? loperamide (IMODIUM) 2 mg capsule Take 2 mg by mouth as needed for diarrhea (4mg with first loose stool. Max of 16mg in 24hours).   ? magnesium oxide (MAG-OX) 400 mg (241.3 mg magnesium) tablet Take 400 mg by mouth 2 (two) times a day.    ? metoprolol succinate (TOPROL-XL) 25 MG Take 25 mg by mouth daily. AND give 50 mg by mouth daily   ? multivitamin with minerals (MULTIPLE VITAMIN-MINERALS) tablet Take 1 tablet by mouth daily.   ? omega 3-dha-epa-fish oil 1,000 mg (120 mg-180 mg) cap Take 1 capsule by mouth daily.   ? oxyCODONE (ROXICODONE) 5 MG immediate release tablet Take 2.5 mg by mouth every 4 (four) hours as needed for pain.   ? pantoprazole (PROTONIX) 40 MG tablet Take 40 mg by mouth daily.   ? warfarin sodium (WARFARIN ORAL) Take by mouth at bedtime. 2/8/21 INR 2.01  Take 2mg daily.  Next INR 2/11/21.         REVIEW OF SYSTEMS:    Currently, no fever, chills, or rigors. Does not have any visual or hearing problems. Denies any chest pain, headaches, palpitations, lightheadedness, dizziness, shortness of breath, or cough. Appetite is good. Denies any GERD symptoms. Denies any difficulty with swallowing, nausea, or vomiting.  Denies any abdominal pain, diarrhea or constipation. Denies any urinary symptoms. No insomnia. No active bleeding. No rash.       PHYSICAL EXAMINATION:  Vitals:    02/10/21 1510   BP: 155/87   Pulse: 65   Temp: 97.9  F (36.6  C)   Weight: 185 lb 9.6 oz (84.2 kg)         GENERAL: Awake, Alert, oriented x3, not in any form of acute distress, answers questions appropriately, follows simple commands, conversant  HEENT: Head is normocephalic with balding hair distribution. No evidence of trauma. Ears: No acute purulent discharge. Eyes:  Conjunctivae pink with no scleral jaundice. Nose: Normal mucosa and septum. NECK: Supple with no cervical or supraclavicular lymphadenopathy. Trachea is midline.   ABDOMEN: Rotund and semi soft, a bit tender to palpation,  no masses, no organomegaly, good bowel sounds,Dressings in place and LE bulb has been removed  EXTREMITIES: Atraumatic. Full range of motion on both upper and lower extremities, there is no tenderness to palpation, no pedal edema, no cyanosis or clubbing, no calf tenderness, normal cap refill, no joint swelling.  SKIN: Warm and dry, no erythema noted, no rashes or lesions.  NEUROLOGICAL: The patient is oriented to person, place and time. Strength and sensation are grossly intact. Face is symmetric.          Social Distancing and PPE utilized for todays assessment due to Covid 19 precautions.          LABS:    Lab Results   Component Value Date    WBC 9.5 02/08/2021    HGB 12.5 (L) 02/08/2021    HCT 38.5 (L) 02/08/2021     (H) 02/08/2021     02/08/2021       Results for orders placed or performed in visit on 02/08/21   Basic Metabolic Panel   Result Value Ref Range    Sodium 137 136 - 145 mmol/L    Potassium 4.7 3.5 - 5.0 mmol/L    Chloride 100 98 - 107 mmol/L    CO2 26 22 - 31 mmol/L    Anion Gap, Calculation 11 5 - 18 mmol/L    Glucose 280 (H) 70 - 125 mg/dL    Calcium 9.1 8.5 - 10.5 mg/dL    BUN 20 8 - 28 mg/dL    Creatinine 1.28 0.70 - 1.30 mg/dL    GFR MDRD Af Amer >60 >60 mL/min/1.73m2    GFR MDRD Non Af Amer 52 (L) >60 mL/min/1.73m2           Lab Results   Component Value Date    HGBA1C 7.7 (H) 10/07/2020     Vitamin D, Total (25-Hydroxy)   Date Value Ref Range Status   10/07/2020 24.2 (L) 30.0 - 80.0 ng/mL Final     Lab Results   Component Value Date    [id number] 777 10/07/2020       ASSESSMENT/PLAN:  1. Abdominal visceral abscess (H)    2. Long term current use of anticoagulant therapy        1, Abdominal Absess: Augmentin ordered until 2/18, he follows with GI, LE bulb  removed per IR due to clogged. He is tolerating well w/o abdominal pain or discomfort.    2. Afib: INR scheduled for 2/11/21, on coumadin 2 mg daily as he is on abx we are being a bit more conservative and last INR was at 2.01.  Assessment revealed no excessive  bleeding or bruising observed and denies melena and heaturia.    Electronically signed by:  Daisy Bond CNP  This progress note was completed using Dragon software and there may be grammatical errors.

## 2021-06-21 NOTE — LETTER
Letter by Mary Ann Chang MBBS at      Author: Mary Ann Chang MBBS Service: -- Author Type: --    Filed:  Encounter Date: 2/9/2021 Status: (Other)         [facility]  [address] [address]                                  February 9, 2021    Patient: Burke Recinos   MR Number: [id number]   YOB: 1927   Date of Visit: 2/9/2021     Dear Dr. Christina:    Thank you for referring Burke Recinos to me for evaluation. Below are the relevant portions of my assessment and plan of care.    If you have questions, please do not hesitate to call me. I look forward to following Burke along with you.    Sincerely,        GUNNAR Boucher          CC  No Recipients  Mary Ann Chang MBBS  2/9/2021  3:45 PM  Sign when Signing Visit    [facility] note      Patient: Burke Recinos  MRN: [id number]      [facility] [[id number]]  Reason for Visit     Chief Complaint   Patient presents with   ? H & P       Code Status   dnr    Assessment     Abdominal abscess status post IR drain placement on 2/1/2021  Cultures growing E. coli and strep angnosis  Status post repeat visit in the ER overnight because of nonfunctioning LE drain and abdominal pain  Underlying history of adenocarcinoma of the colon status post hemicolectomy  Hypertension  Chronic atrial fibrillation on warfarin  Type 2 diabetes  History of diabetic polyneuropathy  Chronic kidney disease  Anemia of chronic kidney disease.    Plan     Patient was admitted in the hospital and evaluated by both surgery as well as interventional radiology and infectious disease.  He was noted to have an abdominal abscess for which he had a drain placed.  Infectious disease recommended continuation of IV Zosyn in the hospital prior to discharge  At discharge he was switched to oral Augmentin  He has been recommended to go for an outpatient abscessogram within a week.  In addition it has been recommended that he continue Augmentin for a  week after the drain is removed  Unfortunately his drain has not been working well and was suspected to be malpositioned.  Last night he went to the emergency room  Notes were reviewed and no new orders were obtained however he now has follow-up today with his surgeon.  Will await their recommendations-he has been n.p.o. since this morning at the recommendation  Pain concerns are stable and he is not reporting any acute pain he is tolerating a good regular diet  Recheck labs reviewed INR is therapeutic at 2  CBC with a hemoglobin of 12.5 no leukocytosis electrolytes and kidney functions are stable  Diabetic control reviewed and he is showing some elevated blood sugars.  Control appears to be suboptimal.  We will monitor trends before adjusting medications.  Continue with his current PT OT      History     Patient is a very pleasant 89 y.o. male who is admitted to TCU  Patient presented to the emergency room with left upper quadrant pain with epigastric pain.  A CT of the abdomen and pelvis showed development of an abscess with the upper anterior peritoneal cavity  He was admitted in the hospital and underwent drain placement.  He is required to have a CT abdomen in 7 to 10 days and antibiotics recommended.  Fluid cultures grew E. coli and strep  He unfortunately had problems with his LE drain and was sent back to the emergency room yesterday.  He has a follow-up appointment today with his surgeon  Patient is also diabetic.  Last A1c was 8.3 indicating somewhat suboptimal control.  This is reflected in his blood sugars which are frequently over 200 noted in the TCU  He also has a history of chronic atrial fibrillation.  He is on Coumadin INRs will be monitored heart rates and blood pressures have been stable  Due to generalized weakness and debilitation admitted to the TCU for strengthening and rehab    Past Medical History     Active Ambulatory (Non-Hospital) Problems    Diagnosis   ? Sensorineural hearing loss  (SNHL) of both ears   ? Chronic anemia   ? Coronary artery disease   ? Gastroesophageal reflux disease without esophagitis   ? History of malignant neoplasm of colon   ? Abdominal visceral abscess (H)   ? Hypomagnesemia   ? Low vitamin B12 level   ? Chest pain   ? CHF (congestive heart failure) (H)   ? Malaise and fatigue   ? Type 2 diabetes mellitus without complication (H)   ? Memory problem   ? Colon obstruction (H)   ? Chronic atrial fibrillation (H)   ? Hyperkalemia   ? Adenocarcinoma of colon (H)   ? Iron deficiency anemia   ? Benign prostatic hyperplasia with post-void dribbling   ? Diabetic peripheral neuropathy (H)   ? Poor balance   ? Long term current use of anticoagulant therapy   ? ACP (advance care planning)   ? Cardiac pacemaker in situ   ? Essential hypertension   ? Cardiovascular disease   ? Sleep apnea   ? Osteoarthrosis involving lower leg   ? Pure hypercholesterolemia     Past Medical History:   Diagnosis Date   ? Abdominal visceral abscess (H) 1/31/2021   ? ACP (advance care planning) 1/25/2014   ? Adenocarcinoma of colon (H)    ? Atrial fibrillation (H)    ? Benign prostatic hyperplasia with post-void dribbling 12/12/2017   ? BPH (benign prostatic hyperplasia)    ? Cardiac pacemaker in situ 7/18/2012   ? Cardiovascular disease    ? Chest pain 1/20/2020   ? CHF (congestive heart failure) (H) 1/20/2020   ? Chronic anemia 2/4/2021   ? Chronic atrial fibrillation (H) 3/25/2019   ? Colon obstruction (H)    ? Coronary artery disease 2/4/2021   ? Diabetic peripheral neuropathy (H) 12/12/2017   ? Diverticulosis of colon    ? DM2 (diabetes mellitus, type 2) (H)    ? Essential hypertension 7/8/2008   ? GERD (gastroesophageal reflux disease)    ? History of malignant neoplasm of colon 2/4/2021   ? HLD (hyperlipidemia)    ? Hyperkalemia 3/25/2019   ? Hypomagnesemia 1/24/2020   ? Iron deficiency anemia    ? Long term current use of anticoagulant therapy 5/20/2016   ? Low vitamin B12 level 1/24/2020   ?  Malaise and fatigue 1/20/2020   ? Memory problem 5/1/2019   ? Obstructive sleep apnea    ? Osteoarthrosis    ? Osteoarthrosis involving lower leg 7/10/2006   ? Poor balance 12/12/2017   ? Pure hypercholesterolemia 7/10/2006   ? Sensorineural hearing loss (SNHL) of both ears 2/4/2021   ? Sleep apnea 7/10/2006   ? Type 2 diabetes mellitus without complication (H) 12/5/2019       Past Social History     Reviewed, and he  reports that he quit smoking about 51 years ago. His smoking use included cigars. He has never used smokeless tobacco. He reports current alcohol use.    Family History     Reviewed, and family history includes Alcohol abuse in his father; Breast cancer in his mother; Cancer in his brother.    Medication List   Post Discharge Medication Reconciliation Status: discharge medications reconciled, continue medications without change   Current Outpatient Medications on File Prior to Visit   Medication Sig Dispense Refill   ? acetaminophen (TYLENOL) 650 MG CR tablet Take 650 mg by mouth every 4 (four) hours as needed for pain.     ? amoxicillin-clavulanate (AUGMENTIN) 250-125 mg per tablet Take 1 tablet by mouth 3 (three) times a day. for 14 Days     ? atorvastatin (LIPITOR) 40 MG tablet Take 40 mg by mouth at bedtime.     ? cholecalciferol, vitamin D3, 1,000 unit (25 mcg) tablet Take 2,000 Units by mouth daily.     ? DULoxetine (CYMBALTA) 60 MG capsule Take 60 mg by mouth daily.     ? ferrous sulfate 325 (65 FE) MG tablet Take 1 tablet by mouth daily with breakfast.     ? finasteride (PROSCAR) 5 mg tablet Take 5 mg by mouth daily.     ? furosemide (LASIX) 20 MG tablet Take 20 mg by mouth 2 (two) times a week. every Mon, Thurs     ? glipiZIDE (GLUCOTROL XL) 2.5 MG 24 hr tablet Take 2.5 mg by mouth daily.     ? insulin aspart U-100 (NOVOLOG) 100 unit/mL (3 mL) injection pen Inject under the skin 3 (three) times a day before meals. Inject as per sliding scale:  if 200 - 249 = 1 unit;  250 - 299 = 2 unit;  300 -  349 = 3 unit;  350 - 399 = 4 unit;  400+ = 5 units And call MD,subcutaneously three times a day     ? loperamide (IMODIUM) 2 mg capsule Take 2 mg by mouth as needed for diarrhea (4mg with first loose stool. Max of 16mg in 24hours).     ? magnesium oxide (MAG-OX) 400 mg (241.3 mg magnesium) tablet Take 400 mg by mouth 2 (two) times a day.      ? metoprolol succinate (TOPROL-XL) 25 MG Take 25 mg by mouth daily. AND give 50 mg by mouth daily     ? multivitamin with minerals (MULTIPLE VITAMIN-MINERALS) tablet Take 1 tablet by mouth daily.     ? omega 3-dha-epa-fish oil 1,000 mg (120 mg-180 mg) cap Take 1 capsule by mouth daily.     ? oxyCODONE (ROXICODONE) 5 MG immediate release tablet Take 2.5 mg by mouth every 4 (four) hours as needed for pain.     ? pantoprazole (PROTONIX) 40 MG tablet Take 40 mg by mouth daily.     ? warfarin sodium (WARFARIN ORAL) Take by mouth at bedtime. 2/8/21 INR 2.01  Take 2mg daily.  Next INR 2/11/21.       No current facility-administered medications on file prior to visit.        Allergies     Allergies   Allergen Reactions   ? Vancomycin      Other reaction(s): George Syndrome  Patient may receive vancomycin at rate that is half normal rate   ? Aspirin Other (See Comments)     Relative contraindication due to oral anticoagulation.   ? Metformin Diarrhea   ? Tamsulosin Other (See Comments)     renal failure   ? Niacin Rash       Review of Systems   A comprehensive review of 14 systems was done. Pertinent findings noted here and in history of present illness. All the rest negative.  Constitutional: Negative.  Negative for fever, chills, he has activity change, appetite change and fatigue.   HENT: Negative for congestion and facial swelling.    Eyes: Negative for photophobia, redness and visual disturbance.   Respiratory: Negative for cough and chest tightness.    Cardiovascular: Negative for chest pain, palpitations and leg swelling.   Gastrointestinal: Negative for nausea, diarrhea,  "constipation, blood in stool and abdominal distention.   LE drain is in position  Genitourinary: Negative.    Musculoskeletal: Negative.  Reports weakness but otherwise is doing well  Skin: Negative.    Neurological: Negative for dizziness, tremors, syncope, weakness, light-headedness and headaches.   Hematological: Does not bruise/bleed easily.   Psychiatric/Behavioral: Negative.        Physical Exam     Recent Vitals 2/8/2021   Height 5' 8\"   Weight 201 lbs 6 oz   BSA (m2) 2.09 m2   /80   Pulse 71   Temp 98.4   SpO2 94       Constitutional: Oriented to person, place, and time and appears well-developed.  Patient is obese  HEENT:  Normocephalic and atraumatic.  Eyes: Conjunctivae and EOM are normal. Pupils are equal, round, and reactive to light. No discharge.  No scleral icterus. Nose normal. Mouth/Throat: Oropharynx is clear and moist. No oropharyngeal exudate.    [place]  NECK: Normal range of motion. Neck supple. No JVD present. No tracheal deviation present. No thyromegaly present.   CARDIOVASCULAR: Normal rate, regular rhythm and intact distal pulses.  Exam reveals no gallop and no friction rub.  Systolic murmur present.  Has a pacemaker  PULMONARY: Effort normal and breath sounds normal. No respiratory distress.No Wheezing or rales.  ABDOMEN: Soft. Bowel sounds are normal. No distension and no mass.  There is right upper quadrant tenderness.  LE drain is present with minimal drainage   there is no rebound and no guarding. No HSM.  MUSCULOSKELETAL: Normal range of motion. No edema and no tenderness. Mild kyphosis, no tenderness.  LYMPH NODES: Has no cervical, supraclavicular, axillary and groin adenopathy.   NEUROLOGICAL: Alert and oriented to person, place, and time. No cranial nerve deficit.  Normal muscle tone. Coordination normal.   GENITOURINARY: Deferred exam.  SKIN: Skin is warm and dry. No rash noted. No erythema. No pallor.   EXTREMITIES: No cyanosis, no clubbing, no edema. No " Deformity.  PSYCHIATRIC: Normal mood, affect and behavior.      Lab Results     Recent Results (from the past 240 hour(s))   COVID-19 VIRUS (CORONAVIRUS) BY PCR - EXTERNAL RESULT    Collection Time: 01/31/21  7:16 PM    Specimen: Other    Specimen type and source: Other, Specimen from nasopharyngeal structure (specimen)   Result Value Ref Range    COVID-19 Virus by PCR (External Result) Negative Negative   COVID-19 VIRUS (CORONAVIRUS) BY PCR - EXTERNAL RESULT    Collection Time: 02/04/21 10:00 AM    Specimen: Other    Specimen type and source: Other, Specimen from nasopharyngeal structure (specimen)   Result Value Ref Range    COVID-19 Virus by PCR (External Result) Negative Negative   INR    Collection Time: 02/06/21  5:43 AM   Result Value Ref Range    INR 1.41 (H) 0.90 - 1.10   Basic Metabolic Panel    Collection Time: 02/08/21  9:54 AM   Result Value Ref Range    Sodium 137 136 - 145 mmol/L    Potassium 4.7 3.5 - 5.0 mmol/L    Chloride 100 98 - 107 mmol/L    CO2 26 22 - 31 mmol/L    Anion Gap, Calculation 11 5 - 18 mmol/L    Glucose 280 (H) 70 - 125 mg/dL    Calcium 9.1 8.5 - 10.5 mg/dL    BUN 20 8 - 28 mg/dL    Creatinine 1.28 0.70 - 1.30 mg/dL    GFR MDRD Af Amer >60 >60 mL/min/1.73m2    GFR MDRD Non Af Amer 52 (L) >60 mL/min/1.73m2   INR    Collection Time: 02/08/21  9:54 AM   Result Value Ref Range    INR 2.01 (H) 0.90 - 1.10   HM2(CBC w/o Differential)    Collection Time: 02/08/21  9:54 AM   Result Value Ref Range    WBC 9.5 4.0 - 11.0 thou/uL    RBC 3.83 (L) 4.40 - 6.20 mill/uL    Hemoglobin 12.5 (L) 14.0 - 18.0 g/dL    Hematocrit 38.5 (L) 40.0 - 54.0 %     (H) 80 - 100 fL    MCH 32.6 27.0 - 34.0 pg    MCHC 32.5 32.0 - 36.0 g/dL    RDW 12.1 11.0 - 14.5 %    Platelets 316 140 - 440 thou/uL    MPV 10.3 8.5 - 12.5 fL   Magnesium    Collection Time: 02/08/21  9:54 AM   Result Value Ref Range    Magnesium 1.9 1.8 - 2.6 mg/dL              Electronically signed by  GUNNAR Boucher

## 2021-06-21 NOTE — LETTER
"Letter by Daisy Bond CNP at      Author: Daisy Bond CNP Service: -- Author Type: --    Filed:  Encounter Date: 2021 Status: (Other)         Patient: Burke Recinos   MR Number: [id number]   YOB: 1927   Date of Visit: 2021       [facility]      NAME:  Bruke Recinos             :  1927    MRN: [id number]    CODE STATUS:  DNR    FACILITY: [facility] [[id number]]         CHIEF COMPLAIN/REASON FOR VISIT:  Chief Complaint   Patient presents with   ? Review Of Multiple Medical Conditions     new admission, abd surgery       HISTORY OF PRESENT ILLNESS: Burke Recinos is a 89 y.o. male being seen at the request of the nurses to initiate care with pt. He likes to go by the name Jaime . Jaime comes from [facility], DOS 21 TO 21. Per his EMR:  \"with a history of adenocarcinoma of colon, s/p laparoscopic right hemicolectomy (), Type II DM, HLD, CAD, A-Fib, s/p pacemaker placement (), polymyalgia rheumatica, osteoarthritis, iron deficiency anemia, GERD, BPH, and smoking who was admitted on 2021 after presenting to the [facility] ED for evaluation of LUQ/epigastric abdominal pain for 5 days.     ED Course  In the ED, VS were wnl. Labs pertinent for glucose of 239, WBC of 13.2, RBC of 3.48, and hemoglobin of 11.7. EKG significant for atrial fibrillation and septal infarct. CT Abdomen Pelvis W showed interval development of a 3.7 x 4.8 cm abscess within the upper anterior peritoneal cavity just below the abdominal wall and located inferior to the transverse colon above a couple loops of small bowel. The patient was given IV fluids in the ED and was admitted for further evaluation and management. Patient was started on antibiotics.     Ir was consulted and patient is s/p drain placement. IR recommending repeat CT abdomen in 7-10 days. Also seen by crs and they recommended antibiotics , drain . Cytology of fluid was negative for " malignancy. CRS recommended outpatient follow up. Cytology report as below      1. Marked acute inflammation compatible with abscess   2. Negative for cytologically malignant cells in this sample     Fluid culture grew e coli and strep. Infectious Disease consult requested. Fluid amylase ruling out active leak from gut. Infectious Disease recommended Augmentin and repeat abscessogram in few days. Per Infectious Disease recommendations patient will continue antibiotics for 7 days after drain is removed( 14 days prescription was done but might need longer duration of antibiotics if drain is in longer).  He will have SN monitor his chronic medical conditions with drain management and PT/OT for therapies. We did discuss ACP and [place] reports his desire is DNR, we reviewed POLST and signed today as well.          Allergies   Allergen Reactions   ? Vancomycin      Other reaction(s): George Syndrome  Patient may receive vancomycin at rate that is half normal rate   ? Aspirin Other (See Comments)     Relative contraindication due to oral anticoagulation.   ? Metformin Diarrhea   ? Tamsulosin Other (See Comments)     renal failure   ? Niacin Rash   :     Current Outpatient Medications   Medication Sig   ? acetaminophen (TYLENOL) 650 MG CR tablet Take 650 mg by mouth every 4 (four) hours as needed for pain.   ? amoxicillin-clavulanate (AUGMENTIN) 250-125 mg per tablet Take 1 tablet by mouth 3 (three) times a day. for 14 Days   ? atorvastatin (LIPITOR) 40 MG tablet Take 40 mg by mouth at bedtime.   ? cholecalciferol, vitamin D3, 1,000 unit (25 mcg) tablet Take 2,000 Units by mouth daily.   ? DULoxetine (CYMBALTA) 60 MG capsule Take 60 mg by mouth daily.   ? ferrous sulfate 325 (65 FE) MG tablet Take 1 tablet by mouth daily with breakfast.   ? finasteride (PROSCAR) 5 mg tablet Take 5 mg by mouth daily.   ? furosemide (LASIX) 20 MG tablet Take 20 mg by mouth 2 (two) times a week. every Mon, Thurs   ? glipiZIDE (GLUCOTROL XL) 2.5  MG 24 hr tablet Take 2.5 mg by mouth daily.   ? insulin aspart U-100 (NOVOLOG) 100 unit/mL (3 mL) injection pen Inject under the skin 3 (three) times a day before meals. Inject as per sliding scale:  if 200 - 249 = 1 unit;  250 - 299 = 2 unit;  300 - 349 = 3 unit;  350 - 399 = 4 unit;  400+ = 5 units And call MD,subcutaneously three times a day   ? loperamide (IMODIUM) 2 mg capsule Take 2 mg by mouth as needed for diarrhea (4mg with first loose stool. Max of 16mg in 24hours).   ? magnesium oxide (MAG-OX) 400 mg (241.3 mg magnesium) tablet Take 400 mg by mouth 2 (two) times a day.    ? metoprolol succinate (TOPROL-XL) 25 MG Take 25 mg by mouth daily. AND give 50 mg by mouth daily   ? multivitamin with minerals (MULTIPLE VITAMIN-MINERALS) tablet Take 1 tablet by mouth daily.   ? omega 3-dha-epa-fish oil 1,000 mg (120 mg-180 mg) cap Take 1 capsule by mouth daily.   ? oxyCODONE (ROXICODONE) 5 MG immediate release tablet Take 2.5 mg by mouth every 4 (four) hours as needed for pain.   ? pantoprazole (PROTONIX) 40 MG tablet Take 40 mg by mouth daily.   ? warfarin sodium (WARFARIN ORAL) Take 3 mg by mouth at bedtime.          REVIEW OF SYSTEMS:    Currently, no fever, chills, or rigors. Does not have any visual or hearing problems. Denies any chest pain, headaches, palpitations, lightheadedness, dizziness, shortness of breath, or cough. Appetite is good. Denies any GERD symptoms. Denies any difficulty with swallowing, nausea, or vomiting.  Denies any abdominal pain, diarrhea or constipation. Denies any urinary symptoms. No insomnia. No active bleeding. No rash.       PHYSICAL EXAMINATION:  Vitals:    02/06/21 1413   BP: 136/72   Pulse: 80   Temp: 98.1  F (36.7  C)   Weight: 203 lb (92.1 kg)         GENERAL: Awake, Alert, oriented x3, not in any form of acute distress, answers questions appropriately, follows simple commands, conversant  HEENT: Head is normocephalic with balding hair distribution. No evidence of trauma.  Ears: No acute purulent discharge. Eyes: Conjunctivae pink with no scleral jaundice. Nose: Normal mucosa and septum. NECK: Supple with no cervical or supraclavicular lymphadenopathy. Trachea is midline.   ABDOMEN: Rotund and semi soft, a bit tender to palpation,  no masses, no organomegaly, good bowel sounds,Dressings in place and LE bulb noted with rubra drainage  EXTREMITIES: Atraumatic. Full range of motion on both upper and lower extremities, there is no tenderness to palpation, no pedal edema, no cyanosis or clubbing, no calf tenderness, normal cap refill, no joint swelling.  SKIN: Warm and dry, no erythema noted, no rashes or lesions.  NEUROLOGICAL: The patient is oriented to person, place and time. Strength and sensation are grossly intact. Face is symmetric.          Social Distancing and PPE utilized for todays assessment due to Covid 19 precautions.          LABS:    Lab Results   Component Value Date    WBC 8.3 10/21/2020    HGB 13.3 (L) 10/21/2020    HCT 40.3 10/21/2020     10/21/2020     10/21/2020       Results for orders placed or performed in visit on 10/28/20   Basic Metabolic Panel   Result Value Ref Range    Sodium 137 136 - 145 mmol/L    Potassium 3.9 3.5 - 5.0 mmol/L    Chloride 100 98 - 107 mmol/L    CO2 28 22 - 31 mmol/L    Anion Gap, Calculation 9 5 - 18 mmol/L    Glucose 220 (H) 70 - 125 mg/dL    Calcium 9.1 8.5 - 10.5 mg/dL    BUN 27 8 - 28 mg/dL    Creatinine 1.31 (H) 0.70 - 1.30 mg/dL    GFR MDRD Af Amer >60 >60 mL/min/1.73m2    GFR MDRD Non Af Amer 51 (L) >60 mL/min/1.73m2           Lab Results   Component Value Date    HGBA1C 7.7 (H) 10/07/2020     Vitamin D, Total (25-Hydroxy)   Date Value Ref Range Status   10/07/2020 24.2 (L) 30.0 - 80.0 ng/mL Final     Lab Results   Component Value Date    [id number] 777 10/07/2020       ASSESSMENT/PLAN:  1. Abdominal visceral abscess (H)    2. Chronic atrial fibrillation (H)    3. Essential hypertension    4. Type 2 diabetes mellitus  without complication, without long-term current use of insulin (H)    5. ACP (advance care planning)        1. Abdominal Absess: Augmentin ordered until 2/18, he follows with GI on 2/18 apt. Nursing to completed wound care/dressing changes to abdomen, observe for any s/sx infection to sites. LE drain care and monitoring his ouputs.  PT/OT for safety and strengthening while on TCU    2. Afib: INR scheduled for 2/6 for coumadin dosing. Education on diet and observing for s/sx of bleeding reviewed. Assessment revealed no excessive  bleeding or bruising observed and denies melena and heaturia.    3. HTN: See above med list. Pt with Metaprel and lasix. SN to manage VS and report abnormal readings to PCP. PCP will review BP readings Q visit.    4. DM; SN to monitor for hyper/hypoglycemia. On glipizide daily with sliding scale insulin    5: ACP: Pt discussion on his code status including POLST signing today for DNR status    Electronically signed by:  Daisy Bond CNP  This progress note was completed using Dragon software and there may be grammatical errors.      55  minutes spent of which greater than 60% was face to face communication with the patient about above plan of care including MCS role in his care, TCU routines with therapy focus for strengthening and safety, SN services to manage chronic medical conditions as well as drain management of LE blb and VS monitoring. We discussed ACP and POLST signing as well,

## 2021-06-22 ENCOUNTER — RECORDS - HEALTHEAST (OUTPATIENT)
Dept: LAB | Facility: CLINIC | Age: 86
End: 2021-06-22

## 2021-06-22 ENCOUNTER — AMBULATORY - HEALTHEAST (OUTPATIENT)
Dept: GERIATRICS | Facility: CLINIC | Age: 86
End: 2021-06-22

## 2021-06-22 RX ORDER — NITROGLYCERIN 0.4 MG/1
0.4 TABLET SUBLINGUAL EVERY 5 MIN PRN
Status: SHIPPED | COMMUNITY
Start: 2021-06-22

## 2021-06-22 RX ORDER — FUROSEMIDE 20 MG
20 TABLET ORAL
Status: SHIPPED | COMMUNITY
Start: 2021-06-22

## 2021-06-22 RX ORDER — OXYBUTYNIN CHLORIDE 5 MG/1
5 TABLET, EXTENDED RELEASE ORAL DAILY
Status: SHIPPED | COMMUNITY
Start: 2021-06-22

## 2021-06-23 LAB
ANION GAP SERPL CALCULATED.3IONS-SCNC: 10 MMOL/L (ref 5–18)
BUN SERPL-MCNC: 16 MG/DL (ref 8–28)
CALCIUM SERPL-MCNC: 8.7 MG/DL (ref 8.5–10.5)
CHLORIDE BLD-SCNC: 103 MMOL/L (ref 98–107)
CO2 SERPL-SCNC: 25 MMOL/L (ref 22–31)
CREAT SERPL-MCNC: 1.13 MG/DL (ref 0.7–1.3)
ERYTHROCYTE [DISTWIDTH] IN BLOOD BY AUTOMATED COUNT: 13.5 % (ref 11–14.5)
GFR SERPL CREATININE-BSD FRML MDRD: >60 ML/MIN/1.73M2
GLUCOSE BLD-MCNC: 134 MG/DL (ref 70–125)
HCT VFR BLD AUTO: 30.9 % (ref 40–54)
HGB BLD-MCNC: 9.9 G/DL (ref 14–18)
MCH RBC QN AUTO: 32.4 PG (ref 27–34)
MCHC RBC AUTO-ENTMCNC: 32 G/DL (ref 32–36)
MCV RBC AUTO: 101 FL (ref 80–100)
PLATELET # BLD AUTO: 281 THOU/UL (ref 140–440)
PMV BLD AUTO: 10.1 FL (ref 8.5–12.5)
POTASSIUM BLD-SCNC: 4.7 MMOL/L (ref 3.5–5)
RBC # BLD AUTO: 3.06 MILL/UL (ref 4.4–6.2)
SODIUM SERPL-SCNC: 138 MMOL/L (ref 136–145)
WBC: 8.1 THOU/UL (ref 4–11)

## 2021-06-25 ENCOUNTER — RECORDS - HEALTHEAST (OUTPATIENT)
Dept: LAB | Facility: CLINIC | Age: 86
End: 2021-06-25

## 2021-06-25 ENCOUNTER — COMMUNICATION - HEALTHEAST (OUTPATIENT)
Dept: ANTICOAGULATION | Facility: CLINIC | Age: 86
End: 2021-06-25

## 2021-06-25 DIAGNOSIS — I48.20 CHRONIC ATRIAL FIBRILLATION (H): ICD-10-CM

## 2021-06-25 LAB
ANION GAP SERPL CALCULATED.3IONS-SCNC: 12 MMOL/L (ref 5–18)
BUN SERPL-MCNC: 19 MG/DL (ref 8–28)
CALCIUM SERPL-MCNC: 8.9 MG/DL (ref 8.5–10.5)
CHLORIDE BLD-SCNC: 100 MMOL/L (ref 98–107)
CO2 SERPL-SCNC: 27 MMOL/L (ref 22–31)
CREAT SERPL-MCNC: 1.11 MG/DL (ref 0.7–1.3)
GFR SERPL CREATININE-BSD FRML MDRD: >60 ML/MIN/1.73M2
GLUCOSE BLD-MCNC: 140 MG/DL (ref 70–125)
HGB BLD-MCNC: 10.6 G/DL (ref 14–18)
INR PPP: 1.1 (ref 0.9–1.1)
POTASSIUM BLD-SCNC: 5 MMOL/L (ref 3.5–5)
SODIUM SERPL-SCNC: 139 MMOL/L (ref 136–145)

## 2021-06-25 NOTE — TELEPHONE ENCOUNTER
[facility] Nurse Triage Telephone Note      Provider: HUGO Bravo  Facility: [facility]    Facility Type: TCU    Caller: Jillian  Call Back Number:  [phone]    Allergies: Patient has no allergy information on record.    Reason for call: Pt recently admitted with a hemicolectomy d/t bowel obstruction and is complaining of loose stools x 4 today and a few last night. No pain or distension, they are watery with no distinct smell. No recent ABX per nursing, pt requesting imodium     Verbal Order/Direction given by Provider: collect stool sample for c-diff sample and noro virus, np to come and see Pt    Provider giving order: HUGO Jessica    Verbal order given to: Jillian Martinez RN

## 2021-06-25 NOTE — PROGRESS NOTES
[facility] Admission note      Patient: Burke Recinos  MRN: [id number]  Date of Service: 3/21/2019      [facility] [[id number]]  Reason for Visit     Chief Complaint   Patient presents with     Review Of Multiple Medical Conditions       Code Status     DNR/DNI    Assessment     Status post laparoscopic right hemicolectomy on 3/9/2019 secondary to bowel obstruction  Adenocarcinoma of the colon  Acute renal impairment with a recheck creatinine of 1.3  Acute hyperkalemia with a potassium of 5.1 in the TCU.  Remains persistently high  Acute hypomagnesemia with a magnesium of 1.6 on recheck  Tachycardia on a higher dose of metoprolol  History of orthostatic hypotension in the past  Atrial fibrillation. inr 2.1  Anemia suspected to be blood loss with a hemoglobin of 10.8 on recheck in the TCU  Status post permanent pacemaker  Diabetes type 2  ANGELITO  Gait instability with generalized weakness  Cognitive impairment SLUMS 14/30    Plan     Patient underwent laparoscopic right hemicolectomy and did well postoperatively.  Incision is healing well  He is planning to follow-up with medical oncology and his surgeon as scheduled.  Pain concerns are minimal at present.  Discontinue blood sugar checks as his blood sugars are adequately controlled.  At present he is not a candidate for tight control but all his blood sugars are under 200.  His appetite has been good with no recent weight loss noted  On iron supplementation due to mild anemia  He is currently not on any diuretics or potassium supplementation.  His potassium remains persistently high at 5.1.  He is also showing some worsening renal impairment with a creatinine of 1.3.  Plan is to closely monitor kidney functions advised to push some fluids and recheck BMP closely again next week  Medication review done he is not on any significant diuretics or nephrotoxic agents not on any NSAIDs.  If his potassium remains persistently high on recheck ,will  push some Kayexalate    History     Patient is a very pleasant 89 y.o. male who is admitted to TCU.  Patient status post hemicolectomy.  Incision was examined today and it is healing well no secondary sign of dehiscence or infection.  He is tolerating a regular diet with no dyspepsia constipation or diarrhea reported by patient.  He has a diagnosis of adenocarcinoma based on his biopsy and plans to follow medical oncology.  Patient is currently in the TCU and has been testing his cognitive delay impaired.  Apparently family is aware of those deficits.  His initial testing slums is 14/30.  He is otherwise pleasant and cooperative with cares  He is a diabetic I did review his blood sugars he has been discharged on oral glipizide blood sugars are consistently under 200 since admission  Appetite has been good.  He is ambulating well without any assist device and denies any pain    Past Medical History       Past Medical History:   Diagnosis Date     Adenocarcinoma of colon (H)      Atrial fibrillation (H)      BPH (benign prostatic hyperplasia)      Cardiovascular disease      Colon obstruction (H)      Diverticulosis of colon      DM2 (diabetes mellitus, type 2) (H)      GERD (gastroesophageal reflux disease)      HLD (hyperlipidemia)      Iron deficiency anemia      Obstructive sleep apnea      Osteoarthrosis        Past Social History     Reviewed, and he  reports that he quit smoking about 49 years ago. His smoking use included cigars. he has never used smokeless tobacco. He reports that he drinks alcohol.    Family History     Reviewed, and family history includes Alcohol abuse in his father; Breast cancer in his mother; Cancer in his brother.    Medication List     Current Outpatient Medications on File Prior to Visit   Medication Sig Dispense Refill     acetaminophen (TYLENOL) 325 MG tablet Take 650 mg by mouth every 4 (four) hours as needed for pain.       atorvastatin (LIPITOR) 40 MG tablet Take 40 mg by mouth  at bedtime.       cyanocobalamin 1000 MCG tablet Take 1,000 mcg by mouth daily.       DULoxetine (CYMBALTA) 60 MG capsule Take 60 mg by mouth daily.       glipiZIDE (GLUCOTROL) 10 MG tablet Take 10 mg by mouth 2 (two) times a day before meals.       metoprolol succinate (TOPROL-XL) 25 MG Take 25 mg by mouth 2 (two) times a day.       multivitamin (MULTIPLE VITAMINS ORAL) Take 1 tablet by mouth daily.       nitroglycerin (NITROSTAT) 0.4 MG SL tablet Place 0.4 mg under the tongue every 5 (five) minutes as needed for chest pain.       omega-3 fatty acids 1,000 mg cap Take 1 capsule by mouth daily.       No current facility-administered medications on file prior to visit.        Allergies     Allergies   Allergen Reactions     Vancomycin      Other reaction(s): George Syndrome  Patient may receive vancomycin at rate that is half normal rate     Aspirin Other (See Comments)     Relative contraindication due to oral anticoagulation.     Metformin Diarrhea     Tamsulosin Other (See Comments)     renal failure     Niacin Rash       Review of Systems   A comprehensive review of 14 systems was done. Pertinent findings noted here and in history of present illness. All the rest negative.  Constitutional: Negative.  Negative for fever, chills, he has activity change, appetite change and fatigue.   HENT: Negative for congestion and facial swelling.    Eyes: Negative for photophobia, redness and visual disturbance.   Respiratory: Negative for cough and chest tightness.    Cardiovascular: Negative for chest pain, palpitations and leg swelling.   Gastrointestinal: Negative for nausea, diarrhea, constipation, blood in stool and abdominal distention.   Genitourinary: Negative.    Musculoskeletal: Negative.  reporting some limited endurance  Skin: Negative.    Neurological: Negative for dizziness, tremors, syncope, weakness, light-headedness and headaches.   Hematological: Does not bruise/bleed easily.   Psychiatric/Behavioral:  Negative.  family concerned about cognitive impairment      Physical Exam     Wt 203lb ; bp 123/73 ; t98 p81    Constitutional: Oriented to person, place, and time and appears well-developed. obese  HEENT:  Normocephalic and atraumatic.  Eyes: Conjunctivae and EOM are normal. Pupils are equal, round, and reactive to light. No discharge.  No scleral icterus. Nose normal. Mouth/Throat: Oropharynx is clear and moist. No oropharyngeal exudate.    NECK: Normal range of motion. Neck supple. No JVD present. No tracheal deviation present. No thyromegaly present.   CARDIOVASCULAR: Normal rate, regular rhythm and intact distal pulses.  Exam reveals no gallop and no friction rub.  Systolic murmur present.  Pacemaker present  PULMONARY: Effort normal and breath sounds normal. No respiratory distress.No Wheezing or rales.  ABDOMEN: Soft. Bowel sounds are normal. No distension and no mass.  There is no tenderness. There is no rebound and no guarding. No HSM.  Midline incision is intact  MUSCULOSKELETAL: Normal range of motion. Trace  edema and no tenderness. Mild kyphosis, no tenderness.  LYMPH NODES: Has no cervical, supraclavicular, axillary and groin adenopathy.   NEUROLOGICAL: Alert and oriented to person, place, and time. No cranial nerve deficit.  Normal muscle tone. Coordination normal.   GENITOURINARY: Deferred exam.  SKIN: Skin is warm and dry. No rash noted. No erythema. No pallor.   EXTREMITIES: No cyanosis, no clubbing,  Trace edema. No Deformity.  PSYCHIATRIC: Normal mood, affect and behavior.recall is mpaired      Lab Results       Recent Results (from the past 240 hour(s))   INR   Result Value Ref Range    INR 1.40 (H) 0.90 - 1.10   Basic Metabolic Panel   Result Value Ref Range    Sodium 141 136 - 145 mmol/L    Potassium 5.1 (H) 3.5 - 5.0 mmol/L    Chloride 105 98 - 107 mmol/L    CO2 26 22 - 31 mmol/L    Anion Gap, Calculation 10 5 - 18 mmol/L    Glucose 160 (H) 70 - 125 mg/dL    Calcium 8.8 8.5 - 10.5 mg/dL     BUN 13 8 - 28 mg/dL    Creatinine 1.01 0.70 - 1.30 mg/dL    GFR MDRD Af Amer >60 >60 mL/min/1.73m2    GFR MDRD Non Af Amer >60 >60 mL/min/1.73m2   Magnesium   Result Value Ref Range    Magnesium 1.6 (L) 1.8 - 2.6 mg/dL   HM2(CBC w/o Differential)   Result Value Ref Range    WBC 6.7 4.0 - 11.0 thou/uL    RBC 3.46 (L) 4.40 - 6.20 mill/uL    Hemoglobin 10.8 (L) 14.0 - 18.0 g/dL    Hematocrit 33.8 (L) 40.0 - 54.0 %    MCV 98 80 - 100 fL    MCH 31.2 27.0 - 34.0 pg    MCHC 32.0 32.0 - 36.0 g/dL    RDW 14.0 11.0 - 14.5 %    Platelets 261 140 - 440 thou/uL    MPV 10.1 8.5 - 12.5 fL   C. difficile Toxigenic by PCR   Result Value Ref Range    C.Difficile Toxigenic by PCR Negative Negative    Ribotype 027/NAP1/B1 Presumptive Negative Presumptive Negative   INR   Result Value Ref Range    INR 2.16 (H) 0.90 - 1.10   Basic Metabolic Panel   Result Value Ref Range    Sodium 138 136 - 145 mmol/L    Potassium 5.1 (H) 3.5 - 5.0 mmol/L    Chloride 103 98 - 107 mmol/L    CO2 26 22 - 31 mmol/L    Anion Gap, Calculation 9 5 - 18 mmol/L    Glucose 270 (H) 70 - 125 mg/dL    Calcium 8.9 8.5 - 10.5 mg/dL    BUN 20 8 - 28 mg/dL    Creatinine 1.32 (H) 0.70 - 1.30 mg/dL    GFR MDRD Af Amer >60 >60 mL/min/1.73m2    GFR MDRD Non Af Amer 51 (L) >60 mL/min/1.73m2         GUNNAR Boucher

## 2021-06-25 NOTE — PROGRESS NOTES
[facility] Admission note      Patient: Burke Recinos  MRN: [id number]  Date of Service: 3/19/2019      [facility] [[id number]]  Reason for Visit     Chief Complaint   Patient presents with     H & P       Code Status     DNR/DNI    Assessment     Status post laparoscopic right hemicolectomy on 3/9/2019 secondary to bowel obstruction  Adenocarcinoma of the colon  Acute hyperkalemia with a potassium of 5.1 in the TCU.  Acute hypomagnesemia with a magnesium of 1.6 on recheck  Tachycardia on a higher dose of metoprolol  History of orthostatic hypotension in the past  Atrial fibrillation. inr 2.1  Anemia suspected to be blood loss with a hemoglobin of 10.8 on recheck in the TCU  Status post permanent pacemaker  Diabetes type 2  ANGELITO  Gait instability with generalized weakness  Cognitive impairment SLUMS 14/30    Plan     Patient underwent laparoscopic right hemicolectomy and did well postoperatively.  Pain management to be optimized.  Low fiber diet recommended for him.  Outpatient follow-up with medical oncology  He is currently discharged on a higher dose of metoprolol due to tachycardia concerns in the setting of atrial fibrillation.  He also continues on warfarin for anticoagulation.  Monitor blood pressures closely due to previous history of orthostatic hypotension  Patient discharged with blood sugar checks so far his blood sugars have been stable <160  Discharged on oral glipizide.  Start on magnesium supplement 400 daily.  She is not on potassium supplementation so we will recheck a BMP in 2 days.  Start on iron supplementation daily due to anemia concerns family has been concerned about iron.  Discontinue fish oil supplementation due to polypharmacy concern.  Recheck BMP in 2 days.  Discontinue blood sugar checks with my next visit if blood sugars remain stable  Monitor cognitive function slums is 14/30    History     Patient is a very pleasant 89 y.o. male who is admitted to  TCU.  Patient was admitted with abdominal pain associated with diarrhea.  CT revealed focal wall thickening and narrowing of the right colon which was suggestive of a neoplasm resulting in a bowel obstruction.  He was admitted and underwent a laparoscopic right hemicolectomy on 3/9/2019  Postoperatively pathology report shows moderately differentiated adenocarcinoma.  He has been discharged to the TCU with advice to follow-up with medical oncology.  While in the hospital he had tachycardia.  He was given a higher dose of metoprolol.  In the past he has not been able to tolerate a high dose of metoprolol due to orthostatic hypotension and will require close monitoring in the TCU.  He also had congestive heart failure with volume overload and has been given IV Lasix in the hospital fluid status will also need to be monitored  He was reporting significant weakness with gait abnormality and has been discharged to the TCU for strengthening  He is also diabetic he has been discharged on oral glipizide.  Blood sugars were stable continue to monitor blood sugar in the TCU has been recommended for him.    Past Medical History       Past Medical History:   Diagnosis Date     Adenocarcinoma of colon (H)      Atrial fibrillation (H)      BPH (benign prostatic hyperplasia)      Cardiovascular disease      Colon obstruction (H)      Diverticulosis of colon      DM2 (diabetes mellitus, type 2) (H)      GERD (gastroesophageal reflux disease)      HLD (hyperlipidemia)      Iron deficiency anemia      Obstructive sleep apnea      Osteoarthrosis        Past Social History     Reviewed, and he  reports that he quit smoking about 49 years ago. His smoking use included cigars. he has never used smokeless tobacco. He reports that he drinks alcohol.    Family History     Reviewed, and family history includes Alcohol abuse in his father; Breast cancer in his mother; Cancer in his brother.    Medication List     Current Outpatient  Medications on File Prior to Visit   Medication Sig Dispense Refill     acetaminophen (TYLENOL) 325 MG tablet Take 650 mg by mouth every 4 (four) hours as needed for pain.       atorvastatin (LIPITOR) 40 MG tablet Take 40 mg by mouth at bedtime.       cyanocobalamin 1000 MCG tablet Take 1,000 mcg by mouth daily.       DULoxetine (CYMBALTA) 60 MG capsule Take 60 mg by mouth daily.       glipiZIDE (GLUCOTROL) 10 MG tablet Take 10 mg by mouth 2 (two) times a day before meals.       metoprolol succinate (TOPROL-XL) 25 MG Take 25 mg by mouth 2 (two) times a day.       multivitamin (MULTIPLE VITAMINS ORAL) Take 1 tablet by mouth daily.       nitroglycerin (NITROSTAT) 0.4 MG SL tablet Place 0.4 mg under the tongue every 5 (five) minutes as needed for chest pain.       omega-3 fatty acids 1,000 mg cap Take 1 capsule by mouth daily.       No current facility-administered medications on file prior to visit.        Allergies     Allergies   Allergen Reactions     Vancomycin      Other reaction(s): George Syndrome  Patient may receive vancomycin at rate that is half normal rate     Aspirin Other (See Comments)     Relative contraindication due to oral anticoagulation.     Metformin Diarrhea     Tamsulosin Other (See Comments)     renal failure     Niacin Rash       Review of Systems   A comprehensive review of 14 systems was done. Pertinent findings noted here and in history of present illness. All the rest negative.  Constitutional: Negative.  Negative for fever, chills, he has activity change, appetite change and fatigue.   HENT: Negative for congestion and facial swelling.    Eyes: Negative for photophobia, redness and visual disturbance.   Respiratory: Negative for cough and chest tightness.    Cardiovascular: Negative for chest pain, palpitations and leg swelling.   Gastrointestinal: Negative for nausea, diarrhea, constipation, blood in stool and abdominal distention.   Genitourinary: Negative.    Musculoskeletal:  Negative.  reporting some limited endurance  Skin: Negative.    Neurological: Negative for dizziness, tremors, syncope, weakness, light-headedness and headaches.   Hematological: Does not bruise/bleed easily.   Psychiatric/Behavioral: Negative.  family concerned about cognitive impairment      Physical Exam     Wt 203lb ; bp 123/73 ; t98 p81    Constitutional: Oriented to person, place, and time and appears well-developed. obese  HEENT:  Normocephalic and atraumatic.  Eyes: Conjunctivae and EOM are normal. Pupils are equal, round, and reactive to light. No discharge.  No scleral icterus. Nose normal. Mouth/Throat: Oropharynx is clear and moist. No oropharyngeal exudate.    NECK: Normal range of motion. Neck supple. No JVD present. No tracheal deviation present. No thyromegaly present.   CARDIOVASCULAR: Normal rate, regular rhythm and intact distal pulses.  Exam reveals no gallop and no friction rub.  Systolic murmur present.  Pacemaker present  PULMONARY: Effort normal and breath sounds normal. No respiratory distress.No Wheezing or rales.  ABDOMEN: Soft. Bowel sounds are normal. No distension and no mass.  There is no tenderness. There is no rebound and no guarding. No HSM.  Midline incision is intact  MUSCULOSKELETAL: Normal range of motion. Trace  edema and no tenderness. Mild kyphosis, no tenderness.  LYMPH NODES: Has no cervical, supraclavicular, axillary and groin adenopathy.   NEUROLOGICAL: Alert and oriented to person, place, and time. No cranial nerve deficit.  Normal muscle tone. Coordination normal.   GENITOURINARY: Deferred exam.  SKIN: Skin is warm and dry. No rash noted. No erythema. No pallor.   EXTREMITIES: No cyanosis, no clubbing,  Trace edema. No Deformity.  PSYCHIATRIC: Normal mood, affect and behavior.recall is mpaired      Lab Results       Lab Results   Component Value Date    BUN 13 03/16/2019    CALCIUM 8.8 03/16/2019     03/16/2019    CO2 26 03/16/2019    CREATININE 1.01 03/16/2019     GFRAA >60 03/16/2019    GFRNONAA >60 03/16/2019     (H) 03/16/2019    HCT 33.8 (L) 03/16/2019    WBC 6.7 03/16/2019    HGB 10.8 (L) 03/16/2019    MG 1.6 (L) 03/16/2019     03/16/2019    K 5.1 (H) 03/16/2019     03/16/2019           Imaging Results     03/11/19  0744 03/10/19  0711 03/09/19  0656 03/08/19  2212   WBC 9.4 6.7 4.1 L 6.6   HGB 10.7 L 10.0 L 11.3 L 14.5    187 234 304   HCT 33.0 L 31.2 L 34.4 L 43.4   MCV 96 97 95 92     Recent Labs   03/11/19  0744 03/10/19  0711 03/09/19  0656 03/08/19  2212   SODIUM 137 140 141 136   POTASSIUM 3.6 4.9 4.9 4.4   CHLORIDE 99 105 106 99   GW3WTLID 29 28 26 22   BUN 22 21 30 H 31 H   CREATININE 1.13 1.26 H 1.36 H 1.62 H   GLUCOSE 128 H 173 H 188 H 334 H   CALCIUM 8.5 8.3 L 8.9 10.5   Ct Head Brain Wo  Result Date: 3/8/2019  CONCLUSION: 1. Normal for age. 2. No CT finding of a mass, infarct or hemorrhage.    Ct Chest W  Result Date: 3/12/2019  CONCLUSION: 1. No definite evidence for metastatic disease in the chest. 2. Subpleural reticular opacities mid and lower lungs may be due to dependent atelectasis, however, mild pulmonary fibrosis could have this appearance.    Ct Abdomen Pelvis W Iv (oral Contrast = No)  Result Date: 3/8/2019  CONCLUSION: 1. Focal wall thickening and narrowing right colon highly suggestive of neoplasm resulting in bowel obstruction. 2. Small amount of free fluid. 3. Diverticulosis. 4. Left renal cysts. 5. Stenosis proximal SMA.     Xr Abdomen 1 View Portable  Result Date: 3/9/2019  FINDINGS: Nasogastric tube tip proximal stomach. Dilated loops of small intestine in the central abdomen unchanged consistent with some degree of mechanical small bowel obstruction. Gas is present in the colon. Enterolith in the right lower quadrant.       GUNNAR Boucher

## 2021-06-26 NOTE — TELEPHONE ENCOUNTER
Received a faxed INR result for Burke Recinos  From [facility]  INR result dated 6/25/2021 is 1.1    Warfarin - no dose provided.    Questionaire - NO.

## 2021-06-27 NOTE — PROGRESS NOTES
Progress Notes by Daisy Bond CNP at 3/27/2019 10:00 AM     Author: Daisy Bond CNP Service: -- Author Type: Nurse Practitioner    Filed: 3/27/2019  3:19 PM Encounter Date: 3/27/2019 Status: Attested    : Daisy Bond CNP (Nurse Practitioner) Cosigner: Mary Ann Chang MBBS at 3/27/2019  4:08 PM    Attestation signed by Mary Ann Chang MBBS at 3/27/2019  4:08 PM    Agree with discharge plan                [facility]      NAME:  Burke Recinos             :  1927  MRN: [id number]  CODE STATUS:  DNR    VISIT TYPE: DISCHARGE SUMMARY  FACILYTY: [facility] [[id number]]                    PRIMARY CARE PROVIDER: Myles Jimenez MD    DISCHARGE DIAGNOSIS:      No diagnosis found.     DISCHARGE MEDICATIONS:         Medication List           Accurate as of 3/27/19  3:01 PM. If you have any questions, ask your nurse or doctor.               CONTINUE taking these medications    acetaminophen 325 MG tablet  Commonly known as:  TYLENOL     atorvastatin 40 MG tablet  Commonly known as:  LIPITOR     cyanocobalamin 1000 MCG tablet     DULoxetine 60 MG capsule  Commonly known as:  CYMBALTA     ferrous sulfate 325 (65 FE) MG tablet     glipiZIDE 10 MG tablet  Commonly known as:  GLUCOTROL     magnesium oxide 400 mg (241.3 mg magnesium) tablet  Commonly known as:  MAG-OX     metoprolol succinate 25 MG  Commonly known as:  TOPROL-XL     MULTIPLE VITAMINS ORAL     nitroglycerin 0.4 MG SL tablet  Commonly known as:  NITROSTAT     WARFARIN ORAL        STOP taking these medications    omega-3 fatty acids 1,000 mg Cap            HISTORY OF PRESENT ILLNESS: Burke Recinos is a 89 y.o. male being seen today for a face to face visit for a dc in am.PMH of of dyslipidemia, atrial fibrillation, tachybrady syndrome s/p PPM (), CAD s/p PCI (), DM type II, iron deficiency anemia, polymyalgia rheumatica, and ANGELITO who was admitted on 3/9/2019 after presenting to the ED for  "evaluation of abdominal pain and vomiting.   Per chart review, the patient was admitted to [facility] from 1/26-1/27/2019 for evaluation of syncope. Telemetry and head CT were normal. He was seen by PT and noted to have some gait abnormalities. He was discharged home with PT.   The patient reports coffee-ground emesis the past few days prior to admission. No diarrhea but has to \"work hard\" at his stool to get it out. Abdominal pain severe at times, particularly in the lower abdomen, suprapubic and he was found to have a bowel obstruction. Came to TCU deconditioned, now stronger and having regular BM. He is dced on coumadin 2 mg daily  and will need INR f/u and PCP to dose his coumadin after dc.    SKILLED NURSING FACILITY COURSE:  During this TCU stay, patient completed all anticipated goals of therapy.      PHYSICAL EXAMINATION:    Vitals:    03/27/19 1001   BP: 126/74   Pulse: 76   Temp: (!) 96  F (35.6  C)   Weight: 203 lb (92.1 kg)         GENERAL: Awake, Alert, oriented x3, not in any form of acute distress, answers questions appropriately, follows simple commands, conversant  HEENT: Head is normocephalic with normal hair distribution. No evidence of trauma. Ears: No acute purulent discharge. Eyes: Conjunctivae pink with no scleral jaundice. Nose: Normal mucosa and septum.   CHEST: No tenderness or deformity, no crepitus  LUNG: Clear to auscultation with good chest expansion. There are no crackles or wheezes, normal AP diameter.  BACK: No kyphosis of the thoracic spine. Symmetric, no curvature, ROM normal, no CVA tenderness, no spinal tenderness   CVS: There is good S1  S2, there are no murmurs, rubs, gallops, or heaves, rhythm is regular.  ABDOMEN: Globular and soft, nontender to palpation, non distended, no masses, no organomegaly, good bowel sounds, no rebound or guarding, no peritoneal signs. Midline incision healing, well aproximated  EXTREMITIES: Atraumatic. Full range of motion on both upper and " lower extremities, there is no tenderness to palpation, +1 pedal edema, no cyanosis or clubbing, no calf tenderness, normal cap refill, no joint swelling.  SKIN: Warm and dry, no erythema noted, no rashes or lesions.  NEUROLOGICAL: The patient is oriented to person, place and time. Strength and sensation are grossly intact. Face is symmetric.      LABS:  All labs reviewed in the nursing home record.        DISCHARGE PLAN: I certify that this patient is under Dr. Chang's care, seen by the NP, and had a face-to-face encounter that meets the physician face-to-face encounter requirements on 3/27/19  The encounter was in whole, or part related to the primary reason for home health.  The Patient is homebound due to: S/P surgical bowel obstruction, and it  is taxing and it will take a considerable amount of effort for patient to leave the home. He is dependent on others for transportation.  The patient is confined to her home and needs intermittent skilled nursing, PT,OT, RN, and HHA.  The patient has been under the care of Dr. Chang/NP and Dr. Chang  initiated the establishment of the plan of care.        Patient to be followed by home care for physical therapy to eval and treat for strengthening, balance, endurance, and safety with mobility, and ambulation.  Patient to be followed by home care for occupational therapy to eval and treat for strengthening, ADL needs, adaptive equipment, and safety.  Patient to be followed by home care for nursing services for medication set up and teaching, symptom and disease processes monitoring and education.    Patient to be followed by home care for home health aid services for bathing and ADL needs.    Patient will follow up with PCP within 7 days after discharge for medication mangagment and appropriate lab studies.    Post Op visit with Dr Jacquie Hancock on 4/10 @1130 am.          Electronically signed by:  Daisy Bond CNP  This progress note was completed using Dragon software  and there may be grammatical errors.      For documentation purposes, chart review, medication management, and discharge coordination of care was greater than 35 minutes

## 2021-06-28 LAB
CHOLEST SERPL-MCNC: 116 MG/DL
FASTING STATUS PATIENT QL REPORTED: ABNORMAL
HDLC SERPL-MCNC: 27 MG/DL
LDLC SERPL CALC-MCNC: 53 MG/DL
LDLC SERPL CALC-MCNC: 62 MG/DL
TRIGL SERPL-MCNC: 182 MG/DL

## 2021-06-29 ENCOUNTER — COMMUNICATION - HEALTHEAST (OUTPATIENT)
Dept: ANTICOAGULATION | Facility: CLINIC | Age: 86
End: 2021-06-29

## 2021-06-29 DIAGNOSIS — I48.20 CHRONIC ATRIAL FIBRILLATION (H): ICD-10-CM

## 2021-06-29 LAB — INR PPP: 1.4 (ref 0.9–1.1)

## 2021-06-30 ENCOUNTER — RECORDS - HEALTHEAST (OUTPATIENT)
Dept: LAB | Facility: CLINIC | Age: 86
End: 2021-06-30

## 2021-06-30 NOTE — PROGRESS NOTES
Progress Notes by Daisy Bond CNP at 2/10/2021 11:56 AM     Author: Daisy Bond CNP Service: -- Author Type: Nurse Practitioner    Filed: 2021  5:35 AM Encounter Date: 2/10/2021 Status: Addendum    : Daisy Bond CNP (Nurse Practitioner)    Related Notes: Original Note by Daisy Bond CNP (Nurse Practitioner) filed at 2/10/2021  3:21 PM         [facility]      NAME:  Burke Recinos             :  1927  MRN: [id number]    VISIT TYPE: DISCHARGE SUMMARY  FACILYTY: [facility] [[id number]]                  CODE STATUS: DNR  PRIMARY CARE PROVIDER: Myles Jimenez MD    DISCHARGE DIAGNOSIS:      1. Abdominal visceral abscess (H)    2. Long term current use of anticoagulant therapy         DISCHARGE MEDICATIONS:      Due to unplanned discontinue meds were reviewed on facility EMR that does not interface with Epic. Please see most current accurate orders that were sent home with pt from TCU.       Medication List          Accurate as of February 10, 2021  5:04 PM. If you have any questions, ask your nurse or doctor.            CONTINUE taking these medications    acetaminophen 650 MG CR tablet  Commonly known as: TYLENOL     amoxicillin-clavulanate 250-125 mg per tablet  Commonly known as: AUGMENTIN     atorvastatin 40 MG tablet  Commonly known as: LIPITOR     cholecalciferol (vitamin D3) 1,000 unit (25 mcg) tablet     DULoxetine 60 MG capsule  Commonly known as: CYMBALTA     ferrous sulfate 325 (65 FE) MG tablet     finasteride 5 mg tablet  Commonly known as: PROSCAR     furosemide 20 MG tablet  Commonly known as: LASIX     glipiZIDE 2.5 MG 24 hr tablet  Commonly known as: GLUCOTROL XL     insulin aspart U-100 100 unit/mL (3 mL) injection pen  Commonly known as: NovoLOG     loperamide 2 mg capsule  Commonly known as: IMODIUM     magnesium oxide 400 mg (241.3 mg magnesium) tablet  Commonly known as: MAG-OX     metoprolol succinate 25 MG  Commonly  "known as: TOPROL-XL     Multiple Vitamin-Minerals tablet  Generic drug: multivitamin with minerals     omega 3-dha-epa-fish oil 1,000 mg (120 mg-180 mg) Cap     oxyCODONE 5 MG immediate release tablet  Commonly known as: ROXICODONE     pantoprazole 40 MG tablet  Commonly known as: PROTONIX     WARFARIN ORAL            HISTORY OF PRESENT ILLNESS: Burke Recinos is a 89 y.o. male, goes by Nunica, being see for an unexpected discharge, pt driven. He is seen ambulating in halls with nursing staff and walker. Gait strong. Face to face visit completed due to need for HHA services upon discontinue home.  [place] comes from [facility], DOS 1/31/21 TO 2/4/21. Per his EMR:  \"with a history of adenocarcinoma of colon, s/p laparoscopic right hemicolectomy (2019), Type II DM, HLD, CAD, A-Fib, s/p pacemaker placement (2007), polymyalgia rheumatica, osteoarthritis, iron deficiency anemia, GERD, BPH, and smoking who was admitted on 1/31/2021 after presenting to the [facility] ED for evaluation of LUQ/epigastric abdominal pain for 5 days.     ED Course  In the ED, VS were wnl. Labs pertinent for glucose of 239, WBC of 13.2, RBC of 3.48, and hemoglobin of 11.7. EKG significant for atrial fibrillation and septal infarct. CT Abdomen Pelvis W showed interval development of a 3.7 x 4.8 cm abscess within the upper anterior peritoneal cavity just below the abdominal wall and located inferior to the transverse colon above a couple loops of small bowel. The patient was given IV fluids in the ED and was admitted for further evaluation and management. Patient was started on antibiotics.     Ir was consulted and patient is s/p drain placement. IR recommending repeat CT abdomen in 7-10 days. Also seen by crs and they recommended antibiotics , drain . Cytology of fluid was negative for malignancy. CRS recommended outpatient follow up. Cytology report as below      1. Marked acute inflammation compatible with abscess   2. Negative for cytologically " malignant cells in this sample     Fluid culture grew e coli and strep. Infectious Disease consult requested. Fluid amylase ruling out active leak from gut. Infectious Disease recommended Augmentin and repeat abscessogram in few days. Per Infectious Disease recommendations patient will continue antibiotics for 7 days after drain is removed( 14 days prescription was done but might need longer duration of antibiotics if drain is in longer).  His LE bulb was actually clogged and removed by IR this week. Has abdominal dressings across umbilicus, soft belly with BS. He reports he is having BMs and tummy soft.   After visit for review of medical systems I was called and told he is to discontinue tomorrow to [facility] [place] with PT/OT/RN services    SKILLED NURSING FACILITY COURSE:  During this TCU stay, patient has not completed all anticipated goals of therapy.      PHYSICAL EXAMINATION:    Vitals:    02/10/21 1510   BP: 155/87   Pulse: 65   Temp: 97.9  F (36.6  C)   Weight: 185 lb 9.6 oz (84.2 kg)         GENERAL: Awake, Alert, oriented x3, not in any form of acute distress, answers questions appropriately, follows simple commands, conversant  HEENT: Head is normocephalic with normal hair distribution. No evidence of trauma. Ears: No acute purulent discharge. Eyes: Conjunctivae pink with no scleral jaundice. Nose: Normal mucosa and septum. NECK: Supple with no cervical or supraclavicular lymphadenopathy. Trachea is midline.   ABDOMEN: Globular and soft, nontender to palpation, non distended, has dressing on abdomen good bowel sounds, no rebound or guarding, no peritoneal signs.   EXTREMITIES: Atraumatic. Full range of motion on both upper and lower extremities, there is no tenderness to palpation, no pedal edema, no cyanosis or clubbing, no calf tenderness, normal cap refill, no joint swelling.  SKIN: Warm and dry, no erythema noted, no rashes or lesions.  NEUROLOGICAL: The patient is oriented to person, place and  time. Strength and sensation are grossly intact. Face is symmetric.    Attempted social distancing and utilized PPE for today assessment for covid 19 precautions.    LABS:  All labs reviewed in the nursing home record.        DISCHARGE PLAN: The encounter was in whole, or part related to the primary reason for home health.  The Patient is homebound due to: Abdominal abcess and over all weakness s/p hospital stay and  it is  taxing and it will take a considerable amount of effort for patient to leave the home.  She is dependent on others for transportation.  The patient is confined to her home and needs intermittent skilled nursing, PT,OT, RN,and HHA.  The patient has been under the care of Dr. Chang/NP and Dr. Chang  initiated the establishment of the plan of care.        Patient to be followed by home care for physical therapy to eval and treat for strengthening, balance, endurance, and safety with mobility, and ambulation.  Patient to be followed by home care for occupational therapy to eval and treat for strengthening, ADL needs, adaptive equipment, and safety.  Patient to be followed by home care for nursing services for medication set up and teaching, symptom and disease processes monitoring and education.    Patient to be followed by home care for home health aid services for bathing and ADL needs.  Planned discharge.  All therapy goals have been met.  Family will assist with discharge and transportation.    *    Patient will follow up with PCP within 7- days after discharge for medication mangagment and appropriate lab studies.            Electronically signed by:  Daisy Bond CNP  This progress note was completed using Dragon software and there may be grammatical errors.      For documentation purposes, chart review, medication management, and discharge coordination of care was greater than 35 minutes

## 2021-07-01 LAB
ANION GAP SERPL CALCULATED.3IONS-SCNC: 12 MMOL/L (ref 5–18)
BUN SERPL-MCNC: 26 MG/DL (ref 8–28)
CALCIUM SERPL-MCNC: 9.4 MG/DL (ref 8.5–10.5)
CHLORIDE BLD-SCNC: 102 MMOL/L (ref 98–107)
CO2 SERPL-SCNC: 26 MMOL/L (ref 22–31)
CREAT SERPL-MCNC: 1.15 MG/DL (ref 0.7–1.3)
GFR SERPL CREATININE-BSD FRML MDRD: 59 ML/MIN/1.73M2
GLUCOSE BLD-MCNC: 143 MG/DL (ref 70–125)
POTASSIUM BLD-SCNC: 5.3 MMOL/L (ref 3.5–5)
SODIUM SERPL-SCNC: 140 MMOL/L (ref 136–145)

## 2021-07-02 ENCOUNTER — COMMUNICATION - HEALTHEAST (OUTPATIENT)
Dept: ANTICOAGULATION | Facility: CLINIC | Age: 86
End: 2021-07-02

## 2021-07-02 DIAGNOSIS — I48.20 CHRONIC ATRIAL FIBRILLATION (H): ICD-10-CM

## 2021-07-02 LAB — INR PPP: 2.6 (ref 0.9–1.1)

## 2021-07-04 NOTE — TELEPHONE ENCOUNTER
Telephone Encounter by Emani Lopez RN at 7/2/2021  8:23 AM     Author: Emani Lopez RN Service: -- Author Type: Registered Nurse    Filed: 7/2/2021  8:25 AM Encounter Date: 7/2/2021 Status: Signed    : Emani Lopez RN (Registered Nurse)       Received a faxed INR result for Burke Recinos  From [facility]  INR result dated 7/2/2021 is 2.6    Warfarin dose - 4mg on 6/30 and 7/1.    Questionaire - NO.

## 2021-07-04 NOTE — TELEPHONE ENCOUNTER
Telephone Encounter by Jessica Lombardo RN at 7/2/2021  9:53 AM     Author: Jessica Lombardo RN Service: -- Author Type: Registered Nurse    Filed: 7/2/2021  9:59 AM Encounter Date: 7/2/2021 Status: Signed    : Jessica Lombardo RN (Registered Nurse)       ANTICOAGULATION MANAGEMENT     Burke Recinos 89 y.o., male is on warfarin with Therapeutic INR result (goal range 2.0-3.0)    Recent labs: (last 7 days)     07/02/21   INR 2.60*       ASSESSMENT     Source: Chart Review and Home Care/Facility Nurse      Warfarin dosing taken: Warfarin taken as instructed    Diet: No new diet changes affecting INR    Illness, Injury or hospitalization: No    Medication changes: None    Signs or symptoms of bleeding or clotting: No    Previous INR: subtherapeutic    [facility] [place]     PLAN     Recommended plan for no diet, medication or health factor changes affecting INR:     Dosing instructions: Continue your current warfarin dose 4 mg daily on tue/thu/sat; and 3 mg daily rest of week (0% change)    Follow up no later than: 4-5 days     Telephone call with Madison nurse at [facility] who verbalizes understanding and agrees to plan    Orders given to  Homecare nurse/facility to recheck Madison will relay to [place]      Plan made per [facility] anticoagulation protocol    Jessica Lombardo  Anticoagulation Clinic   [phone]    Anticoagulation Episode Summary     Current INR goal:  2.0-3.0   TTR:  --   Next INR check:  7/6/2021   INR from last check:  2.60 (7/2/2021)   Weekly max warfarin dose:     Target end date:  Indefinite   INR check location:     Preferred lab:     Send INR reminders to:  [facility] (TCU/LTC/jail)    Indications    Chronic atrial fibrillation (H) [I48.20]           Comments:           Anticoagulation Care Providers     Provider Role Specialty Phone number    Liam Cardona DO [facility] [phone]

## 2021-07-05 PROBLEM — R41.89 COGNITIVE IMPAIRMENT: Status: ACTIVE | Noted: 2019-05-01

## 2021-07-05 PROBLEM — L03.311 ABDOMINAL WALL CELLULITIS: Status: ACTIVE | Noted: 2021-03-27

## 2021-07-05 PROBLEM — N40.1 BENIGN PROSTATIC HYPERPLASIA WITH LOWER URINARY TRACT SYMPTOMS: Status: ACTIVE | Noted: 2017-12-12

## 2021-07-05 PROBLEM — N18.30 STAGE 3 CHRONIC KIDNEY DISEASE (H): Status: ACTIVE | Noted: 2021-02-09

## 2021-07-06 VITALS
DIASTOLIC BLOOD PRESSURE: 54 MMHG | TEMPERATURE: 97.5 F | SYSTOLIC BLOOD PRESSURE: 88 MMHG | OXYGEN SATURATION: 97 % | RESPIRATION RATE: 20 BRPM | HEART RATE: 118 BPM

## 2021-07-07 ENCOUNTER — RECORDS - HEALTHEAST (OUTPATIENT)
Dept: LAB | Facility: CLINIC | Age: 86
End: 2021-07-07

## 2021-07-07 ENCOUNTER — AMBULATORY - HEALTHEAST (OUTPATIENT)
Dept: GERIATRICS | Facility: CLINIC | Age: 86
End: 2021-07-07

## 2021-07-07 LAB
ALBUMIN UR-MCNC: NEGATIVE G/DL
APPEARANCE UR: CLEAR
BILIRUB UR QL STRIP: NEGATIVE
COLOR UR AUTO: ABNORMAL
GLUCOSE UR STRIP-MCNC: ABNORMAL MG/DL
HGB UR QL STRIP: NEGATIVE
KETONES UR STRIP-MCNC: NEGATIVE MG/DL
LEUKOCYTE ESTERASE UR QL STRIP: NEGATIVE
NITRATE UR QL: NEGATIVE
PH UR STRIP: 5 [PH] (ref 5–8)
SP GR UR STRIP: 1.01 (ref 1–1.03)
UROBILINOGEN UR STRIP-ACNC: ABNORMAL

## 2021-07-07 NOTE — TELEPHONE ENCOUNTER
ANTICOAGULATION  MANAGEMENT    Assessment     Today's INR result of 1.1 is Subtherapeutic (goal INR of 2.0-3.0)        Restarting warfarin today for atrial fibrillation had been held while on heparin with plan to restart warfarin but was not admitted to TCU on warfarin    Acute health changes, adenocarcinoma of colon, may be affecting INR    No new medication/supplements affecting INR    Continues to tolerate warfarin with no reported s/s of bleeding or thromboembolism       Plan:     Warfarin Dosing Orders:  Start warfarin 3 mg daily in consult with Janene James PharmD    Next INR: tuyamil 6/29    Telephone orders given to nurse, Ivana.  Orders read back correctly.     Jessica Lombardo RN    Subjective/Objective:      Burke Recinos, a 89 y.o. male is on warfarin recently admitted to TCU under care of [facility].  Chart reviewed:    Outpatient anticoagulation information:         Recent hospitalization review:    Reason for hospitalization prior to TCU admission: adenocarcinoma of the colon with resection    Hospital warfarin management: held while on heparin    Hospital medication changes pertinent to anticoagulation: No    Health changes pertinent to anticoagulation during hospitalization: hgb 9.9 continues on iron      TCU Facility nurse report since admission:    Other anticoagulants: No    Medication changes: No    Abnormal bleeding since last INR: No    New symptoms, injury or illness: No     Upcoming surgery, procedure or cardioversion: No      Recent INR Results:    Lab Results   Component Value Date    INR 2.20 (H) 06/09/2021    INR 1.20 (H) 06/02/2021    INR 1.76 (H) 05/26/2021       Anticoagulation Episode Summary     Current INR goal:  2.0-3.0   TTR:  --   Next INR check:  6/29/2021   INR from last check:     Weekly max warfarin dose:     Target end date:  Indefinite   INR check location:     Preferred lab:     [facility]     Indications    Chronic atrial fibrillation (H) [I48.20]           Comments:           Anticoagulation Care Providers     Provider Role Specialty Phone number    Liam Cardona,  [facility] [phone]

## 2021-07-07 NOTE — TELEPHONE ENCOUNTER
Telephone Encounter by Jessica Lombardo RN at 6/29/2021  9:56 AM     Author: Jessica Lombardo RN Service: -- Author Type: Registered Nurse    Filed: 6/29/2021  9:59 AM Encounter Date: 6/29/2021 Status: Signed    : Jessica Lombardo RN (Registered Nurse)       ANTICOAGULATION  MANAGEMENT    Assessment     Today's INR result of 1.4 is Subtherapeutic (goal INR of 2.0-3.0)        Previous INR was Subtherapeutic    Warfarin given as previously instructed    No new health/diet changes affecting INR    No new medication/supplements affecting INR    Continues to tolerate warfarin with no reported s/s of bleeding or thromboembolism       Plan:     Warfarin Dosing Orders:  Change warfarin dose to 4 mg daily on tue/wed/thu; and 3 mg daily rest of week  (14 % change)    Next INR: fri 7/2    Telephone orders given to nurseAnnamarie.  Orders read back correctly.     Jessica Lombardo RN    Subjective/Objective:      Burke Recinos, a 89 y.o. male is on warfarin. Facility nurse reports for Burke:    Other anticoagulants: No    Medication changes: No     Missed warfarin doses since last INR: No     Abnormal bleeding since last INR: No    New symptoms, injury or illness: No     Upcoming surgery, procedure or cardioversion: No    Recent INR Results:    Lab Results   Component Value Date    INR 1.40 (!) 06/29/2021    INR 1.10 06/25/2021    INR 2.20 (H) 06/09/2021       Anticoagulation Episode Summary     Current INR goal:  2.0-3.0   TTR:  --   Next INR check:  7/2/2021   INR from last check:  1.40 (6/29/2021)   Weekly max warfarin dose:     Target end date:  Indefinite   INR check location:     Preferred lab:     Send INR reminders to:  [facility] (TCU/LTC/[facility])    Indications    Chronic atrial fibrillation (H) [I48.20]           Comments:           Anticoagulation Care Providers     Provider Role Specialty Phone number    Liam Cardona DO Referring Family Medicine [phone]

## 2021-07-12 PROCEDURE — 87086 URINE CULTURE/COLONY COUNT: CPT | Mod: ORL | Performed by: INTERNAL MEDICINE

## 2021-07-12 PROCEDURE — 81003 URINALYSIS AUTO W/O SCOPE: CPT | Mod: ORL | Performed by: INTERNAL MEDICINE

## 2021-07-13 ENCOUNTER — LAB REQUISITION (OUTPATIENT)
Dept: LAB | Facility: CLINIC | Age: 86
End: 2021-07-13
Payer: MEDICARE

## 2021-07-13 DIAGNOSIS — I10 ESSENTIAL (PRIMARY) HYPERTENSION: ICD-10-CM

## 2021-07-13 DIAGNOSIS — D64.9 ANEMIA, UNSPECIFIED: ICD-10-CM

## 2021-07-13 DIAGNOSIS — R30.0 DYSURIA: ICD-10-CM

## 2021-07-13 DIAGNOSIS — E78.5 HYPERLIPIDEMIA, UNSPECIFIED: ICD-10-CM

## 2021-07-13 LAB
ALBUMIN UR-MCNC: NEGATIVE MG/DL
APPEARANCE UR: CLEAR
BILIRUB UR QL STRIP: NEGATIVE
COLOR UR AUTO: YELLOW
GLUCOSE UR STRIP-MCNC: NEGATIVE MG/DL
HGB UR QL STRIP: NEGATIVE
KETONES UR STRIP-MCNC: NEGATIVE MG/DL
LEUKOCYTE ESTERASE UR QL STRIP: NEGATIVE
NITRATE UR QL: NEGATIVE
PH UR STRIP: 5 [PH] (ref 5–7)
SP GR UR STRIP: 1.01 (ref 1–1.03)
UROBILINOGEN UR STRIP-MCNC: <2 MG/DL

## 2021-07-14 LAB
ALBUMIN SERPL-MCNC: 3 G/DL (ref 3.5–5)
ALP SERPL-CCNC: 104 U/L (ref 45–120)
ALT SERPL W P-5'-P-CCNC: 16 U/L (ref 0–45)
ANION GAP SERPL CALCULATED.3IONS-SCNC: 11 MMOL/L (ref 5–18)
AST SERPL W P-5'-P-CCNC: 19 U/L (ref 0–40)
BACTERIA UR CULT: NO GROWTH
BILIRUB SERPL-MCNC: 0.8 MG/DL (ref 0–1)
BUN SERPL-MCNC: 40 MG/DL (ref 8–28)
CALCIUM SERPL-MCNC: 9.7 MG/DL (ref 8.5–10.5)
CHLORIDE BLD-SCNC: 103 MMOL/L (ref 98–107)
CHOLEST SERPL-MCNC: 118 MG/DL
CO2 SERPL-SCNC: 25 MMOL/L (ref 22–31)
CREAT SERPL-MCNC: 1.43 MG/DL (ref 0.7–1.3)
ERYTHROCYTE [DISTWIDTH] IN BLOOD BY AUTOMATED COUNT: 13.4 % (ref 10–15)
FASTING STATUS PATIENT QL REPORTED: ABNORMAL
GFR SERPL CREATININE-BSD FRML MDRD: 42 ML/MIN/1.73M2
GLUCOSE BLD-MCNC: 183 MG/DL (ref 70–125)
HBA1C MFR BLD: 7.3 %
HCT VFR BLD AUTO: 33.1 % (ref 40–53)
HDLC SERPL-MCNC: 25 MG/DL
HGB BLD-MCNC: 10.6 G/DL (ref 13.3–17.7)
LDLC SERPL CALC-MCNC: 45 MG/DL
MCH RBC QN AUTO: 32.7 PG (ref 26.5–33)
MCHC RBC AUTO-ENTMCNC: 32 G/DL (ref 31.5–36.5)
MCV RBC AUTO: 102 FL (ref 78–100)
PLATELET # BLD AUTO: 196 10E3/UL (ref 150–450)
POTASSIUM BLD-SCNC: 5.2 MMOL/L (ref 3.5–5)
PROT SERPL-MCNC: 6.7 G/DL (ref 6–8)
RBC # BLD AUTO: 3.24 10E6/UL (ref 4.4–5.9)
SODIUM SERPL-SCNC: 139 MMOL/L (ref 136–145)
TRIGL SERPL-MCNC: 242 MG/DL
WBC # BLD AUTO: 7.4 10E3/UL (ref 4–11)

## 2021-07-14 PROCEDURE — 85027 COMPLETE CBC AUTOMATED: CPT | Mod: ORL | Performed by: INTERNAL MEDICINE

## 2021-07-14 PROCEDURE — 82040 ASSAY OF SERUM ALBUMIN: CPT | Mod: ORL | Performed by: INTERNAL MEDICINE

## 2021-07-14 PROCEDURE — 82565 ASSAY OF CREATININE: CPT | Mod: ORL | Performed by: INTERNAL MEDICINE

## 2021-07-14 PROCEDURE — 83718 ASSAY OF LIPOPROTEIN: CPT | Mod: ORL | Performed by: INTERNAL MEDICINE

## 2021-07-14 PROCEDURE — 36415 COLL VENOUS BLD VENIPUNCTURE: CPT | Mod: ORL | Performed by: INTERNAL MEDICINE

## 2021-07-14 PROCEDURE — P9604 ONE-WAY ALLOW PRORATED TRIP: HCPCS | Mod: ORL | Performed by: INTERNAL MEDICINE

## 2021-07-14 PROCEDURE — 83036 HEMOGLOBIN GLYCOSYLATED A1C: CPT | Mod: ORL | Performed by: INTERNAL MEDICINE

## 2021-09-14 ENCOUNTER — LAB REQUISITION (OUTPATIENT)
Dept: LAB | Facility: CLINIC | Age: 86
End: 2021-09-14
Payer: MEDICARE

## 2021-09-14 DIAGNOSIS — N40.0 BENIGN PROSTATIC HYPERPLASIA WITHOUT LOWER URINARY TRACT SYMPTOMS: ICD-10-CM

## 2021-09-14 DIAGNOSIS — C18.9 MALIGNANT NEOPLASM OF COLON, UNSPECIFIED (H): ICD-10-CM

## 2021-09-14 DIAGNOSIS — E83.42 HYPOMAGNESEMIA: ICD-10-CM

## 2021-09-14 DIAGNOSIS — E53.8 DEFICIENCY OF OTHER SPECIFIED B GROUP VITAMINS: ICD-10-CM

## 2021-09-14 DIAGNOSIS — E55.9 VITAMIN D DEFICIENCY, UNSPECIFIED: ICD-10-CM

## 2021-09-14 DIAGNOSIS — E11.65 TYPE 2 DIABETES MELLITUS WITH HYPERGLYCEMIA (H): ICD-10-CM

## 2021-09-14 DIAGNOSIS — I50.9 HEART FAILURE, UNSPECIFIED (H): ICD-10-CM

## 2021-09-14 DIAGNOSIS — E78.00 PURE HYPERCHOLESTEROLEMIA, UNSPECIFIED: ICD-10-CM

## 2021-09-15 LAB
ALBUMIN SERPL-MCNC: 3.4 G/DL (ref 3.5–5)
ALP SERPL-CCNC: 97 U/L (ref 45–120)
ALT SERPL W P-5'-P-CCNC: 17 U/L (ref 0–45)
ANION GAP SERPL CALCULATED.3IONS-SCNC: 10 MMOL/L (ref 5–18)
AST SERPL W P-5'-P-CCNC: 19 U/L (ref 0–40)
BILIRUB SERPL-MCNC: 0.9 MG/DL (ref 0–1)
BUN SERPL-MCNC: 33 MG/DL (ref 8–28)
CALCIUM SERPL-MCNC: 9.3 MG/DL (ref 8.5–10.5)
CHLORIDE BLD-SCNC: 101 MMOL/L (ref 98–107)
CHOLEST SERPL-MCNC: 94 MG/DL
CO2 SERPL-SCNC: 26 MMOL/L (ref 22–31)
CREAT SERPL-MCNC: 1.33 MG/DL (ref 0.7–1.3)
ERYTHROCYTE [DISTWIDTH] IN BLOOD BY AUTOMATED COUNT: 13 % (ref 10–15)
FASTING STATUS PATIENT QL REPORTED: ABNORMAL
GFR SERPL CREATININE-BSD FRML MDRD: 45 ML/MIN/1.73M2
GLUCOSE BLD-MCNC: 223 MG/DL (ref 70–125)
HBA1C MFR BLD: 7.9 %
HCT VFR BLD AUTO: 35.8 % (ref 40–53)
HDLC SERPL-MCNC: 26 MG/DL
HGB BLD-MCNC: 11.8 G/DL (ref 13.3–17.7)
IRON SATN MFR SERPL: 21 % (ref 20–50)
IRON SERPL-MCNC: 59 UG/DL (ref 42–175)
LDLC SERPL CALC-MCNC: 41 MG/DL
MCH RBC QN AUTO: 32.1 PG (ref 26.5–33)
MCHC RBC AUTO-ENTMCNC: 33 G/DL (ref 31.5–36.5)
MCV RBC AUTO: 97 FL (ref 78–100)
PLATELET # BLD AUTO: 205 10E3/UL (ref 150–450)
POTASSIUM BLD-SCNC: 4 MMOL/L (ref 3.5–5)
PROT SERPL-MCNC: 7.1 G/DL (ref 6–8)
PSA SERPL-MCNC: 5.08 UG/L (ref 0–6.5)
RBC # BLD AUTO: 3.68 10E6/UL (ref 4.4–5.9)
SODIUM SERPL-SCNC: 137 MMOL/L (ref 136–145)
TIBC SERPL-MCNC: 285 UG/DL (ref 313–563)
TRANSFERRIN SERPL-MCNC: 228 MG/DL (ref 212–360)
TRIGL SERPL-MCNC: 137 MG/DL
VIT B12 SERPL-MCNC: 421 PG/ML (ref 213–816)
WBC # BLD AUTO: 7.5 10E3/UL (ref 4–11)

## 2021-09-15 PROCEDURE — 36415 COLL VENOUS BLD VENIPUNCTURE: CPT | Mod: ORL | Performed by: INTERNAL MEDICINE

## 2021-09-15 PROCEDURE — G0103 PSA SCREENING: HCPCS | Mod: ORL | Performed by: INTERNAL MEDICINE

## 2021-09-15 PROCEDURE — 83036 HEMOGLOBIN GLYCOSYLATED A1C: CPT | Mod: ORL | Performed by: INTERNAL MEDICINE

## 2021-09-15 PROCEDURE — 83540 ASSAY OF IRON: CPT | Mod: ORL | Performed by: INTERNAL MEDICINE

## 2021-09-15 PROCEDURE — 82607 VITAMIN B-12: CPT | Mod: ORL | Performed by: INTERNAL MEDICINE

## 2021-09-15 PROCEDURE — 85027 COMPLETE CBC AUTOMATED: CPT | Mod: ORL | Performed by: INTERNAL MEDICINE

## 2021-09-15 PROCEDURE — 80061 LIPID PANEL: CPT | Mod: ORL | Performed by: INTERNAL MEDICINE

## 2021-09-15 PROCEDURE — 82306 VITAMIN D 25 HYDROXY: CPT | Mod: ORL | Performed by: INTERNAL MEDICINE

## 2021-09-15 PROCEDURE — 80053 COMPREHEN METABOLIC PANEL: CPT | Mod: ORL | Performed by: INTERNAL MEDICINE

## 2021-09-15 PROCEDURE — P9603 ONE-WAY ALLOW PRORATED MILES: HCPCS | Mod: ORL | Performed by: INTERNAL MEDICINE

## 2021-09-16 LAB — DEPRECATED CALCIDIOL+CALCIFEROL SERPL-MC: 46 UG/L (ref 30–80)

## 2021-09-28 ENCOUNTER — LAB REQUISITION (OUTPATIENT)
Dept: LAB | Facility: CLINIC | Age: 86
End: 2021-09-28
Payer: MEDICARE

## 2021-09-28 DIAGNOSIS — C18.7 MALIGNANT NEOPLASM OF SIGMOID COLON (H): ICD-10-CM

## 2021-09-29 LAB — CEA SERPL-MCNC: 28.2 NG/ML (ref 0–3)

## 2021-09-29 PROCEDURE — P9604 ONE-WAY ALLOW PRORATED TRIP: HCPCS | Mod: ORL | Performed by: INTERNAL MEDICINE

## 2021-09-29 PROCEDURE — 82378 CARCINOEMBRYONIC ANTIGEN: CPT | Mod: ORL | Performed by: INTERNAL MEDICINE

## 2021-09-29 PROCEDURE — 36415 COLL VENOUS BLD VENIPUNCTURE: CPT | Mod: ORL | Performed by: INTERNAL MEDICINE

## 2021-10-11 ENCOUNTER — LAB REQUISITION (OUTPATIENT)
Dept: LAB | Facility: CLINIC | Age: 86
End: 2021-10-11
Payer: MEDICARE

## 2021-10-11 DIAGNOSIS — C18.2 MALIGNANT NEOPLASM OF ASCENDING COLON (H): ICD-10-CM

## 2021-10-20 LAB — CEA SERPL-MCNC: 17.4 NG/ML (ref 0–3)

## 2021-10-20 PROCEDURE — 36415 COLL VENOUS BLD VENIPUNCTURE: CPT | Mod: ORL | Performed by: INTERNAL MEDICINE

## 2021-10-20 PROCEDURE — P9604 ONE-WAY ALLOW PRORATED TRIP: HCPCS | Mod: ORL | Performed by: INTERNAL MEDICINE

## 2021-10-20 PROCEDURE — 82378 CARCINOEMBRYONIC ANTIGEN: CPT | Mod: ORL | Performed by: INTERNAL MEDICINE

## 2021-12-06 ENCOUNTER — LAB REQUISITION (OUTPATIENT)
Dept: LAB | Facility: CLINIC | Age: 86
End: 2021-12-06
Payer: MEDICARE

## 2021-12-06 DIAGNOSIS — C18.9 MALIGNANT NEOPLASM OF COLON, UNSPECIFIED (H): ICD-10-CM

## 2021-12-08 LAB
ALBUMIN SERPL-MCNC: 3.1 G/DL (ref 3.5–5)
ALP SERPL-CCNC: 80 U/L (ref 45–120)
ALT SERPL W P-5'-P-CCNC: 11 U/L (ref 0–45)
ANION GAP SERPL CALCULATED.3IONS-SCNC: 10 MMOL/L (ref 5–18)
AST SERPL W P-5'-P-CCNC: 27 U/L (ref 0–40)
BASOPHILS # BLD AUTO: 0 10E3/UL (ref 0–0.2)
BASOPHILS NFR BLD AUTO: 0 %
BILIRUB SERPL-MCNC: 1.1 MG/DL (ref 0–1)
BUN SERPL-MCNC: 37 MG/DL (ref 8–28)
CALCIUM SERPL-MCNC: 9.4 MG/DL (ref 8.5–10.5)
CEA SERPL-MCNC: 37.2 NG/ML (ref 0–3)
CHLORIDE BLD-SCNC: 104 MMOL/L (ref 98–107)
CO2 SERPL-SCNC: 23 MMOL/L (ref 22–31)
CREAT SERPL-MCNC: 1.73 MG/DL (ref 0.7–1.3)
EOSINOPHIL # BLD AUTO: 0.2 10E3/UL (ref 0–0.7)
EOSINOPHIL NFR BLD AUTO: 2 %
ERYTHROCYTE [DISTWIDTH] IN BLOOD BY AUTOMATED COUNT: 12.2 % (ref 10–15)
GFR SERPL CREATININE-BSD FRML MDRD: 33 ML/MIN/1.73M2
GLUCOSE BLD-MCNC: 212 MG/DL (ref 70–125)
HCT VFR BLD AUTO: 33.2 % (ref 40–53)
HGB BLD-MCNC: 10.8 G/DL (ref 13.3–17.7)
IMM GRANULOCYTES # BLD: 0.1 10E3/UL
IMM GRANULOCYTES NFR BLD: 1 %
LYMPHOCYTES # BLD AUTO: 1.3 10E3/UL (ref 0.8–5.3)
LYMPHOCYTES NFR BLD AUTO: 15 %
MCH RBC QN AUTO: 32.3 PG (ref 26.5–33)
MCHC RBC AUTO-ENTMCNC: 32.5 G/DL (ref 31.5–36.5)
MCV RBC AUTO: 99 FL (ref 78–100)
MONOCYTES # BLD AUTO: 0.6 10E3/UL (ref 0–1.3)
MONOCYTES NFR BLD AUTO: 7 %
NEUTROPHILS # BLD AUTO: 6.8 10E3/UL (ref 1.6–8.3)
NEUTROPHILS NFR BLD AUTO: 75 %
NRBC # BLD AUTO: 0 10E3/UL
NRBC BLD AUTO-RTO: 0 /100
PLATELET # BLD AUTO: 200 10E3/UL (ref 150–450)
POTASSIUM BLD-SCNC: 4.8 MMOL/L (ref 3.5–5)
PROT SERPL-MCNC: 7 G/DL (ref 6–8)
RBC # BLD AUTO: 3.34 10E6/UL (ref 4.4–5.9)
SODIUM SERPL-SCNC: 137 MMOL/L (ref 136–145)
WBC # BLD AUTO: 9 10E3/UL (ref 4–11)

## 2021-12-08 PROCEDURE — 80053 COMPREHEN METABOLIC PANEL: CPT | Mod: ORL | Performed by: INTERNAL MEDICINE

## 2021-12-08 PROCEDURE — P9603 ONE-WAY ALLOW PRORATED MILES: HCPCS | Mod: ORL | Performed by: INTERNAL MEDICINE

## 2021-12-08 PROCEDURE — 82378 CARCINOEMBRYONIC ANTIGEN: CPT | Mod: ORL | Performed by: INTERNAL MEDICINE

## 2021-12-08 PROCEDURE — 36415 COLL VENOUS BLD VENIPUNCTURE: CPT | Mod: ORL | Performed by: INTERNAL MEDICINE

## 2021-12-08 PROCEDURE — 85025 COMPLETE CBC W/AUTO DIFF WBC: CPT | Mod: ORL | Performed by: INTERNAL MEDICINE

## 2021-12-14 ENCOUNTER — LAB REQUISITION (OUTPATIENT)
Dept: LAB | Facility: CLINIC | Age: 86
End: 2021-12-14
Payer: MEDICARE

## 2021-12-14 DIAGNOSIS — D64.9 ANEMIA, UNSPECIFIED: ICD-10-CM

## 2021-12-15 LAB
ERYTHROCYTE [DISTWIDTH] IN BLOOD BY AUTOMATED COUNT: 12.4 % (ref 10–15)
HCT VFR BLD AUTO: 32.2 % (ref 40–53)
HGB BLD-MCNC: 10.3 G/DL (ref 13.3–17.7)
MCH RBC QN AUTO: 32.5 PG (ref 26.5–33)
MCHC RBC AUTO-ENTMCNC: 32 G/DL (ref 31.5–36.5)
MCV RBC AUTO: 102 FL (ref 78–100)
PLATELET # BLD AUTO: 251 10E3/UL (ref 150–450)
RBC # BLD AUTO: 3.17 10E6/UL (ref 4.4–5.9)
WBC # BLD AUTO: 8.1 10E3/UL (ref 4–11)

## 2021-12-15 PROCEDURE — 36415 COLL VENOUS BLD VENIPUNCTURE: CPT | Mod: ORL | Performed by: INTERNAL MEDICINE

## 2021-12-15 PROCEDURE — 85027 COMPLETE CBC AUTOMATED: CPT | Mod: ORL | Performed by: INTERNAL MEDICINE

## 2021-12-15 PROCEDURE — P9604 ONE-WAY ALLOW PRORATED TRIP: HCPCS | Mod: ORL | Performed by: INTERNAL MEDICINE

## 2024-03-21 NOTE — TELEPHONE ENCOUNTER
[facility] Nurse Triage Anticoagulation Note      Provider: HUGO Bravo  Facility: [facility]    Facility Type: TCU    Caller: Madison  Call Back Number:  [phone]    Reason for call: INR    Today s INR: 3.35  Previous INR: 3/25 3.61(held).    Home dose:  3mg daily    Diagnosis/Goal: AFIB  Heparin/Lovenox: No   Currently on ABX: No  Other interacting Medications: None  Missed or refused doses: No    Nurse reporting potassium level also.      Verbal Order/Direction given by Provider: Warfarin 2mg daily.  Next INR 3/29/19.      Provider giving order: HUGO Bravo    Verbal order given to: Madison Encinas RN    (4) no limitation